# Patient Record
Sex: FEMALE | Race: WHITE | Employment: FULL TIME | URBAN - METROPOLITAN AREA
[De-identification: names, ages, dates, MRNs, and addresses within clinical notes are randomized per-mention and may not be internally consistent; named-entity substitution may affect disease eponyms.]

---

## 2017-01-16 ENCOUNTER — HOSPITAL ENCOUNTER (EMERGENCY)
Age: 47
Discharge: HOME | End: 2017-01-16
Attending: FAMILY MEDICINE

## 2017-01-16 VITALS
TEMPERATURE: 98.3 F | DIASTOLIC BLOOD PRESSURE: 74 MMHG | SYSTOLIC BLOOD PRESSURE: 130 MMHG | OXYGEN SATURATION: 100 % | WEIGHT: 121.3 LBS | HEART RATE: 78 BPM | BODY MASS INDEX: 20.71 KG/M2 | RESPIRATION RATE: 18 BRPM | HEIGHT: 64 IN

## 2017-01-16 DIAGNOSIS — M26.629 TMJ PAIN DYSFUNCTION SYNDROME: Primary | ICD-10-CM

## 2017-01-16 DIAGNOSIS — F45.8 BRUXISM: ICD-10-CM

## 2017-01-16 RX ORDER — DICLOFENAC POTASSIUM 50 MG/1
50 TABLET, FILM COATED ORAL 3 TIMES DAILY
Qty: 50 TAB | Refills: 0 | Status: SHIPPED | OUTPATIENT
Start: 2017-01-16 | End: 2017-06-28 | Stop reason: ALTCHOICE

## 2017-01-16 RX ORDER — METHOCARBAMOL 500 MG/1
500 TABLET, FILM COATED ORAL 3 TIMES DAILY
Qty: 20 TAB | Refills: 0 | Status: SHIPPED | OUTPATIENT
Start: 2017-01-16 | End: 2017-06-28 | Stop reason: ALTCHOICE

## 2017-01-17 NOTE — UC PROVIDER NOTE
Patient is a 51 y.o. female presenting with ear pain. The history is provided by the patient. No  was used. Ear Pain    This is a new problem. The current episode started 2 days ago. The problem occurs constantly. The problem has not changed since onset. Patient complains that the left ear is affected. There has been no fever. The pain is at a severity of 3/10. The pain is mild. Pertinent negatives include no ear discharge, no headaches, no hearing loss, no rhinorrhea, no sore throat and no neck pain. Risk factors: Denies recent URI, believes that she may be grinding her teeth at night. Her past medical history does not include chronic ear infection, hearing loss or tympanostomy tube.         Past Medical History   Diagnosis Date    Arthritis      L knee DJD    GERD (gastroesophageal reflux disease)     Headache     Hx of breast implants, bilateral     Nausea & vomiting      sometimes    Single cyst of left breast     Thyroid disease         Past Surgical History   Procedure Laterality Date    Hc pack laser cone  1992    Hx knee arthroscopy  2002     left    Hx cyst removal      Hx other surgical       laser surgery for dysplasia    Hx other surgical  2010     rectal     Hx other surgical       cyst removal from ovaries    Hx gyn       removal of 1 ovary and cyst    Hx breast biopsy  x3 -L     2 cysts removed and 1 \"tumor\" removed in the past    Hx breast augmentation  1998     Saline implants         Family History   Problem Relation Age of Onset    Breast Cancer Maternal Aunt      diagnosed in 63's    Cancer Maternal Aunt      breast    Breast Cancer Other      diagnosed in 42's    Other Father      Iritis    Hypertension Father     Thyroid Disease Mother     No Known Problems Sister     No Known Problems Sister     No Known Problems Son     No Known Problems Son     Diabetes Paternal Uncle     Diabetes Maternal Grandmother     Cancer Paternal Grandmother colon    Diabetes Paternal Uncle     Diabetes Paternal Uncle         Social History     Social History    Marital status:      Spouse name: N/A    Number of children: N/A    Years of education: N/A     Occupational History    Not on file. Social History Main Topics    Smoking status: Never Smoker    Smokeless tobacco: Never Used    Alcohol use No      Comment: rare    Drug use: No    Sexual activity: Yes     Partners: Male     Other Topics Concern    Not on file     Social History Narrative                ALLERGIES: Sulfa (sulfonamide antibiotics)    Review of Systems   HENT: Positive for ear pain. Negative for ear discharge, hearing loss, rhinorrhea and sore throat. Musculoskeletal: Negative for neck pain. Neurological: Negative for headaches. Vitals:    01/16/17 1859   BP: 130/74   Pulse: 78   Resp: 18   Temp: 98.3 °F (36.8 °C)   SpO2: 100%   Weight: 55 kg (121 lb 4.8 oz)   Height: 5' 4\" (1.626 m)       Physical Exam   Constitutional: She is oriented to person, place, and time. She appears well-developed and well-nourished. HENT:   Head: Normocephalic and atraumatic. Right Ear: External ear normal.   Left Ear: External ear normal.   Nose: Nose normal.   Mouth/Throat: Oropharynx is clear and moist.   + tenderness to left TMJ  No crepitus with ROM   Eyes: Conjunctivae and EOM are normal. Pupils are equal, round, and reactive to light. Neck: Normal range of motion. Neck supple. Cardiovascular: Normal rate, regular rhythm and normal heart sounds. Pulmonary/Chest: Effort normal and breath sounds normal.   Musculoskeletal: Normal range of motion. Neurological: She is alert and oriented to person, place, and time. Skin: Skin is warm and dry. Psychiatric: She has a normal mood and affect. Her behavior is normal. Judgment and thought content normal.   Nursing note and vitals reviewed.       MDM     Differential Diagnosis; Clinical Impression; Plan:     CLINICAL IMPRESSION:  TMJ pain dysfunction syndrome  (primary encounter diagnosis)  Bruxism    Plan:  1. Try the Diclofenac and Robaxin for pain, consider a mouth guard  2. Your ears look good today. There is no indication of an infection  3. Follow up with dentist   Risk of Significant Complications, Morbidity, and/or Mortality:   Presenting problems: Moderate  Diagnostic procedures:   Moderate  Progress:   Patient progress:  Stable      Procedures

## 2017-01-17 NOTE — DISCHARGE INSTRUCTIONS
Teeth Grinding: Care Instructions  Your Care Instructions  You may not be aware that you are grinding or clenching your teeth (bruxism). For many people, this happens during sleep. Even though you may be able to sleep through it, you may be grinding away parts of your teeth. If you continue to wear away your teeth, you may break or loosen a tooth or filling or wear down your biting edges. Causes of teeth grinding include stress, an abnormal bite, and crooked or missing teeth. In some cases, teeth grinding is made worse by alcohol or drug abuse. Teeth grinding and clenching can cause pain and popping in your jaw joint. Other symptoms are earaches, headaches, and face pain. Talk to your dentist about your teeth. He or she can determine what treatment is right for you. In some cases, a mouth guard or mouth splint can help protect the teeth from further damage. If stress is a cause of your grinding or clenching, your doctor may prescribe medicine to help you relax. Follow-up care is a key part of your treatment and safety. Be sure to make and go to all appointments, and call your doctor if you are having problems. Its also a good idea to know your test results and keep a list of the medicines you take. How can you care for yourself at home? · Ask your doctor or dentist to teach you how to position your tongue, teeth, and jaw to prevent grinding or clenching. Then practice this position, especially before going to sleep. · Take your medicines exactly as prescribed. Call your doctor if you think you are having a problem with your medicine. You will get more details on the specific medicines your doctor prescribes. · Get plenty of sleep. · Put either an ice pack or a warm, moist cloth on your jaw for 15 minutes several times a day if it makes your jaw feel better. Or you can switch back and forth between moist heat and cold. Gently open and close your mouth while you use the ice pack or heat.  But do not use heat if your jaw is swollen. Use only ice until the swelling is gone. · Get at least 30 minutes of exercise on most days of the week to relieve stress. Walking is a good choice. You also may want to do other activities, such as running, swimming, cycling, or playing tennis or team sports. · If you have a sleeping partner, ask him or her to let you know when you are grinding or clenching your teeth. You may be able to change positions and relax your jaw, and you both can go back to sleep. · Practice breathing and relaxation exercises to reduce tension. · Treat yourself to a massage. Some people find regular massages very helpful to relax muscles. You also can give yourself a neck, shoulder, and face massage. · During the day, try to keep your jaw, face, shoulder, and neck muscles relaxed. · Avoid hard or chewy foods (such as popcorn, jerky, tough meats, chewy breads, gum, and raw apples and carrots) that cause your jaws to work very hard. Choose softer foods that are easy to chew, such as eggs, yogurt, and soup. · Cut your food into small, bite-sized pieces, and chew slowly. · Do not chew gum for long periods of time. · If your dentist prescribes a mouth guard or splint, wear it as directed. When should you call for help? Watch closely for changes in your health, and be sure to contact your doctor if:  · You have trouble chewing or opening and closing your jaw. · You have headaches, earaches, or face pain. · You have trouble sleeping because of jaw movement or pain. · You do not get better as expected. Where can you learn more? Go to http://sim-mahesh.info/. Enter X026 in the search box to learn more about \"Teeth Grinding: Care Instructions. \"  Current as of: August 9, 2016  Content Version: 11.1  © 2086-0220 [employer]. Care instructions adapted under license by [employer] (which disclaims liability or warranty for this information).  If you have questions about a medical condition or this instruction, always ask your healthcare professional. [place] [address] any warranty or liability for your use of this information.

## 2017-06-28 ENCOUNTER — OFFICE VISIT (OUTPATIENT)
Dept: PRIMARY CARE CLINIC | Age: 47
End: 2017-06-28

## 2017-06-28 VITALS
BODY MASS INDEX: 21.03 KG/M2 | OXYGEN SATURATION: 98 % | HEIGHT: 64 IN | DIASTOLIC BLOOD PRESSURE: 84 MMHG | RESPIRATION RATE: 16 BRPM | WEIGHT: 123.2 LBS | SYSTOLIC BLOOD PRESSURE: 123 MMHG | HEART RATE: 77 BPM | TEMPERATURE: 98.3 F

## 2017-06-28 DIAGNOSIS — H66.92 LEFT OTITIS MEDIA, UNSPECIFIED CHRONICITY, UNSPECIFIED OTITIS MEDIA TYPE: Primary | ICD-10-CM

## 2017-06-28 RX ORDER — AZITHROMYCIN 250 MG/1
TABLET, FILM COATED ORAL
Qty: 6 TAB | Refills: 0 | Status: SHIPPED | OUTPATIENT
Start: 2017-06-28 | End: 2017-07-19 | Stop reason: ALTCHOICE

## 2017-06-28 NOTE — MR AVS SNAPSHOT
Visit Information Date & Time Provider Department Dept. Phone Encounter #  
 6/28/2017  3:00 PM Danielle Zimmerman NP [address] [facility] [phone] [id number] Follow-up Instructions Return if symptoms worsen or fail to improve. Your Appointments 9/15/2017 10:30 AM  
PHYSICAL with Mauricio Choi, [facility]) Appt Note: CPE//Yearly Physical w/fasting labs//last cpe 8/12/16//BAM-3/29/17; pt r/s from 8/18/17 [address] [facility] [id number]  
[address] [place] [address] Upcoming Health Maintenance Date Due INFLUENZA AGE 9 TO ADULT 8/1/2017 PAP AKA CERVICAL CYTOLOGY 11/1/2017 DTaP/Tdap/Td series (2 - Td) 12/19/2022 Allergies as of 6/28/2017  Review Complete On: 6/28/2017 By: Joaquin Halsted, LPN Severity Noted Reaction Type Reactions Sulfa (Sulfonamide Antibiotics)  03/07/2012    Rash Current Immunizations  Reviewed on 12/19/2012 Name Date Influenza Vaccine Whole 12/3/2012 Tdap 12/19/2012 Not reviewed this visit You Were Diagnosed With   
  
 Codes Comments Left otitis media, unspecified chronicity, unspecified otitis media type    -  Primary ICD-10-CM: H66.92 
ICD-9-CM: 382. 9 Vitals BP Pulse Temp Resp Height(growth percentile) Weight(growth percentile) 123/84 77 98.3 °F (36.8 °C) (Oral) 16 5' 4\" (1.626 m) 123 lb 3.2 oz (55.9 kg) LMP SpO2 BMI OB Status Smoking Status 06/25/2017 (Exact Date) 98% 21.15 kg/m2 Having regular periods Never Smoker Vitals History BMI and BSA Data Body Mass Index Body Surface Area  
 21.15 kg/m 2 1.59 m 2 Preferred Pharmacy Pharmacy Name Phone [address], [address] [place] [address] Tammy Farrell Said [phone] Your Updated Medication List  
  
   
 This list is accurate as of: 6/28/17  3:19 PM.  Always use your most recent med list.  
  
  
  
  
 azithromycin 250 mg tablet Commonly known as:  Bobscott Sparks Take by mouth, take two tablets today then one tablet daily for 4 more days. FLAXSEED OIL PO Take 600 mg by mouth daily. glucosamine-chondroitin 750-600 mg Tab One tab 2 x daily  
  
 rizatriptan 5 mg tablet Commonly known as:  Jerry Stone TAKE ONE TABLET BY MOUTH ONCE AS NEEDED FOR MIGRAINE FOR UP TO 1 DOSE. MAY REPEAT IN 2 HOURS AS NEEDED  
  
 SPIRULINA Take 100 mg by mouth daily. Prescriptions Sent to Pharmacy Refills  
 azithromycin (ZITHROMAX) 250 mg tablet 0 Sig: Take by mouth, take two tablets today then one tablet daily for 4 more days. Class: Normal  
 Pharmacy: Sabrina Nagel  at [address] #: [phone] Follow-up Instructions Return if symptoms worsen or fail to improve. Patient Instructions Ear Infection (Otitis Media): Care Instructions Your Care Instructions An ear infection may start with a cold and affect the middle ear (otitis media). It can hurt a lot. Most ear infections clear up on their own in a couple of days. Most often you will not need antibiotics. This is because many ear infections are caused by a virus. Antibiotics don't work against a virus. Regular doses of pain medicines are the best way to reduce your fever and help you feel better. Follow-up care is a key part of your treatment and safety. Be sure to make and go to all appointments, and call your doctor if you are having problems. It's also a good idea to know your test results and keep a list of the medicines you take. How can you care for yourself at home? · Take pain medicines exactly as directed. ¨ If the doctor gave you a prescription medicine for pain, take it as prescribed.  
¨ If you are not taking a prescription pain medicine, take an over-the-counter medicine, such as acetaminophen (Tylenol), ibuprofen (Advil, Motrin), or naproxen (Aleve). Read and follow all instructions on the label. ¨ Do not take two or more pain medicines at the same time unless the doctor told you to. Many pain medicines have acetaminophen, which is Tylenol. Too much acetaminophen (Tylenol) can be harmful. · Plan to take a full dose of pain reliever before bedtime. Getting enough sleep will help you get better. · Try a warm, moist washcloth on the ear. It may help relieve pain. · If your doctor prescribed antibiotics, take them as directed. Do not stop taking them just because you feel better. You need to take the full course of antibiotics. When should you call for help? Call your doctor now or seek immediate medical care if: 
· You have new or increasing ear pain. · You have new or increasing pus or blood draining from your ear. · You have a fever with a stiff neck or a severe headache. Watch closely for changes in your health, and be sure to contact your doctor if: 
· You have new or worse symptoms. · You are not getting better after taking an antibiotic for 2 days. Where can you learn more? Go to http://sim-mahesh.info/. Enter I657 in the search box to learn more about \"Ear Infection (Otitis Media): Care Instructions. \" Current as of: May 4, 2017 Content Version: 11.3 © 2838-6034 [email or web address]. Care instructions adapted under license by [employer] (which disclaims liability or warranty for this information). If you have questions about a medical condition or this instruction, always ask your healthcare professional. [address] any warranty or liability for your use of this information. Introducing [facility]! Dear Malika Palomares: Thank you for requesting a UCAN account. Our records indicate that you already have an active UCAN account.   You can access your account anytime at [email or web address] Did you know that you can access your hospital and ER discharge instructions at any time in [employer]? You can also review all of your test results from your hospital stay or ER visit. Additional Information If you have questions, please visit the Frequently Asked Questions section of the [employer] website at [email or web address] Remember, [employer] is NOT to be used for urgent needs. For medical emergencies, dial 911. Now available from your iPhone and Android! Please provide this summary of care documentation to your next provider. Your primary care clinician is listed as Oneal Lagos. If you have any questions after today's visit, please call [phone].

## 2017-06-28 NOTE — PROGRESS NOTES
Subjective:      Sirisha Mckeon is a 49 y.o. female who presents for possible ear infection. Symptoms include dizziness for 2-3 weeks. . Onset of symptoms was 2 weeks ago, unchanged since that time. Associated symptoms include lightheadedness, which have been present for 2 weeks . She is drinking plenty of fluids. She has no other sinus congestion, cough or sore throat. She has not felt dizzy in bed or when changing positions. Problem List:     Patient Active Problem List    Diagnosis Date Noted    Fibrocystic breast disease 07/29/2016    FH: diabetes mellitus 05/12/2015    Benign breast lumps 12/19/2012    Back skin lesion 12/19/2012    H/O bilateral breast implants 12/19/2012     Medical History:     Past Medical History:   Diagnosis Date    Arthritis     L knee DJD    GERD (gastroesophageal reflux disease)     Headache     Hx of breast implants, bilateral     Nausea & vomiting     sometimes    Single cyst of left breast     Thyroid disease      Allergies: Allergies   Allergen Reactions    Sulfa (Sulfonamide Antibiotics) Rash      Medications:     Current Outpatient Prescriptions   Medication Sig    azithromycin (ZITHROMAX) 250 mg tablet Take by mouth, take two tablets today then one tablet daily for 4 more days.  GLUCOSAMINE HCL/CHONDR GUZMAN A NA (GLUCOSAMINE-CHONDROITIN) 750-600 mg tab One tab 2 x daily (Patient taking differently: Take 750 mg by mouth daily. One tab 2 x daily)    rizatriptan (MAXALT) 5 mg tablet TAKE ONE TABLET BY MOUTH ONCE AS NEEDED FOR MIGRAINE FOR UP TO 1 DOSE. MAY REPEAT IN 2 HOURS AS NEEDED    BLUE-GREEN ALGAE (SPIRULINA) Take 100 mg by mouth daily.  FLAXSEED OIL PO Take 600 mg by mouth daily. No current facility-administered medications for this visit.       Surgical History:     Past Surgical History:   Procedure Laterality Date    HC PACK LASER CONE  1992    HX BREAST AUGMENTATION  1998    Saline implants    HX BREAST BIOPSY  x3 -L    2 cysts removed and 1 \"tumor\" removed in the past    HX CYST REMOVAL      HX GYN      removal of 1 ovary and cyst    HX KNEE ARTHROSCOPY  2002    left    HX OTHER SURGICAL      laser surgery for dysplasia    HX OTHER SURGICAL  2010    rectal     HX OTHER SURGICAL      cyst removal from ovaries     Social History:     Social History     Social History    Marital status:      Spouse name: N/A    Number of children: N/A    Years of education: N/A     Social History Main Topics    Smoking status: Never Smoker    Smokeless tobacco: Never Used    Alcohol use No      Comment: rare    Drug use: No    Sexual activity: Yes     Partners: Male     Other Topics Concern    None     Social History Narrative         Objective:     ROS:   Feeling well. No dyspnea or chest pain on exertion. No abdominal pain, change in bowel habits, black or bloody stools. No urinary tract symptoms. GYN ROS: normal menses, no abnormal bleeding, pelvic pain or discharge, no breast pain or new or enlarging lumps on self exam. No neurological complaints. OBJECTIVE:   The patient appears well, alert, oriented x 3, in no distress. Visit Vitals    /84    Pulse 77    Temp 98.3 °F (36.8 °C) (Oral)    Resp 16    Ht 5' 4\" (1.626 m)    Wt 123 lb 3.2 oz (55.9 kg)    LMP 06/25/2017 (Exact Date)    SpO2 98%    BMI 21.15 kg/m2     HEENT:ENT  Right ear normal, left TM Bulge and redness. Canals clear. Neck supple. No adenopathy or thyromegaly. ANNEMARIE. Chest: Lungs are clear, good air entry, no wheezes, rhonchi or rales. Cardiovascular: S1 and S2 normal, no murmurs, regular rate and rhythm. Abdomen: soft without tenderness, guarding, mass or organomegaly. Extremities: show no edema, normal peripheral pulses. Neurological: is normal, no focal findings. Assessment/Plan:       ICD-10-CM ICD-9-CM    1.  Left otitis media, unspecified chronicity, unspecified otitis media type H66.92 382.9 azithromycin (ZITHROMAX) 250 mg tablet [facility] Setting

## 2017-06-28 NOTE — PROGRESS NOTES
Chief Complaint   Patient presents with    Dizziness     Dizziness for a few weeks and wondering if it's an ear infection.  Bilateral ear pain on night

## 2017-06-28 NOTE — PATIENT INSTRUCTIONS
Ear Infection (Otitis Media): Care Instructions  Your Care Instructions    An ear infection may start with a cold and affect the middle ear (otitis media). It can hurt a lot. Most ear infections clear up on their own in a couple of days. Most often you will not need antibiotics. This is because many ear infections are caused by a virus. Antibiotics don't work against a virus. Regular doses of pain medicines are the best way to reduce your fever and help you feel better. Follow-up care is a key part of your treatment and safety. Be sure to make and go to all appointments, and call your doctor if you are having problems. It's also a good idea to know your test results and keep a list of the medicines you take. How can you care for yourself at home? · Take pain medicines exactly as directed. ¨ If the doctor gave you a prescription medicine for pain, take it as prescribed. ¨ If you are not taking a prescription pain medicine, take an over-the-counter medicine, such as acetaminophen (Tylenol), ibuprofen (Advil, Motrin), or naproxen (Aleve). Read and follow all instructions on the label. ¨ Do not take two or more pain medicines at the same time unless the doctor told you to. Many pain medicines have acetaminophen, which is Tylenol. Too much acetaminophen (Tylenol) can be harmful. · Plan to take a full dose of pain reliever before bedtime. Getting enough sleep will help you get better. · Try a warm, moist washcloth on the ear. It may help relieve pain. · If your doctor prescribed antibiotics, take them as directed. Do not stop taking them just because you feel better. You need to take the full course of antibiotics. When should you call for help? Call your doctor now or seek immediate medical care if:  · You have new or increasing ear pain. · You have new or increasing pus or blood draining from your ear. · You have a fever with a stiff neck or a severe headache.   Watch closely for changes in your health, and be sure to contact your doctor if:  · You have new or worse symptoms. · You are not getting better after taking an antibiotic for 2 days. Where can you learn more? Go to http://sim-mahesh.info/. Enter G933 in the search box to learn more about \"Ear Infection (Otitis Media): Care Instructions. \"  Current as of: May 4, 2017  Content Version: 11.3  © 3270-2816 [employer]. Care instructions adapted under license by [employer] (which disclaims liability or warranty for this information). If you have questions about a medical condition or this instruction, always ask your healthcare professional. [address] any warranty or liability for your use of this information.

## 2017-07-19 ENCOUNTER — OFFICE VISIT (OUTPATIENT)
Dept: PRIMARY CARE CLINIC | Age: 47
End: 2017-07-19

## 2017-07-19 VITALS
RESPIRATION RATE: 16 BRPM | HEIGHT: 64 IN | BODY MASS INDEX: 21.13 KG/M2 | WEIGHT: 123.8 LBS | DIASTOLIC BLOOD PRESSURE: 91 MMHG | OXYGEN SATURATION: 98 % | SYSTOLIC BLOOD PRESSURE: 147 MMHG | HEART RATE: 73 BPM | TEMPERATURE: 98.4 F

## 2017-07-19 DIAGNOSIS — H92.01 EAR PAIN, RIGHT: Primary | ICD-10-CM

## 2017-07-19 RX ORDER — AZITHROMYCIN 250 MG/1
TABLET, FILM COATED ORAL
COMMUNITY
Start: 2017-06-28 | End: 2017-07-19 | Stop reason: ALTCHOICE

## 2017-07-19 NOTE — MR AVS SNAPSHOT
Visit Information Date & Time Provider Department Dept. Phone Encounter #  
 7/19/2017  1:45 PM Sam Davey, [address] [phone] [id number] Follow-up Instructions Return if symptoms worsen or fail to improve. Your Appointments 9/15/2017 10:30 AM  
PHYSICAL with Natalie Ayala, [address] [facility]) Appt Note: CPE//Yearly Physical w/fasting labs//last cpe 8/12/16//BAM-3/29/17; pt r/s from 8/18/17 819 [facility],[address] [address]  
[address] 235 [facility] [address] [place] [address] Upcoming Health Maintenance Date Due  
 PAP AKA CERVICAL CYTOLOGY 11/1/2017 INFLUENZA AGE 9 TO ADULT 8/1/2017 DTaP/Tdap/Td series (2 - Td) 12/19/2022 Allergies as of 7/19/2017  Review Complete On: 7/19/2017 By: Sam Davey MD  
  
 Severity Noted Reaction Type Reactions Sulfa (Sulfonamide Antibiotics)  03/07/2012    Rash Current Immunizations  Reviewed on 12/19/2012 Name Date Influenza Vaccine Whole 12/3/2012 Tdap 12/19/2012 Not reviewed this visit You Were Diagnosed With   
  
 Codes Comments Ear pain, right    -  Primary ICD-10-CM: H92.01 
ICD-9-CM: 388.70 Vitals BP Pulse Temp Resp Height(growth percentile) Weight(growth percentile) (!) 147/91 (BP 1 Location: Left arm, BP Patient Position: Sitting) 73 98.4 °F (36.9 °C) (Oral) 16 5' 4\" (1.626 m) 123 lb 12.8 oz (56.2 kg) LMP SpO2 BMI OB Status Smoking Status 06/25/2017 (Exact Date) 98% 21.25 kg/m2 Having regular periods Never Smoker Vitals History BMI and BSA Data Body Mass Index Body Surface Area  
 21.25 kg/m 2 1.59 m 2 Preferred Pharmacy Pharmacy Name Phone 39[address] [facility], [address] [place] [address] Tammy Farrell Said [phone] Your Updated Medication List  
  
   
 This list is accurate as of: 7/19/17  2:11 PM.  Always use your most recent med list.  
  
  
  
  
 FLAXSEED OIL PO Take 600 mg by mouth daily. rizatriptan 5 mg tablet Commonly known as:  Alsamantha Clarion TAKE ONE TABLET BY MOUTH ONCE AS NEEDED FOR MIGRAINE FOR UP TO 1 DOSE. MAY REPEAT IN 2 HOURS AS NEEDED  
  
 SPIRULINA Take 100 mg by mouth daily. Follow-up Instructions Return if symptoms worsen or fail to improve. Patient Instructions You do NOT currently have an ear infection. You may have TMJ (jaw joint) or dental issues as a cause. Consider a bruxism (tooth grinding) guard. Gently chewing some sugar-free gum in the morning can help with some jaw stiffness. And, taking REGULAR doses of an anti-inflammatory medicine like Motrin or Aleve can help. Heat may also be helpful. Follow up with your dentist. 
  
Earache: Care Instructions Your Care Instructions Even though infection is a common cause of ear pain, not all ear pain means an infection. If you have ear pain and don't have an infection, it could be because of a jaw problem, such as temporomandibular joint (TMJ) pain. Or it could be because of a neck problem. When ear discomfort or pain is mild or comes and goes without other symptoms, home treatment may be all you need. Follow-up care is a key part of your treatment and safety. Be sure to make and go to all appointments, and call your doctor if you are having problems. It's also a good idea to know your test results and keep a list of the medicines you take. How can you care for yourself at home? · Apply heat on the ear to ease pain. To apply heat, put a warm water bottle, a heating pad set on low, or a warm cloth on your ear. Do not go to sleep with a heating pad on your skin. · Take an over-the-counter pain medicine, such as acetaminophen (Tylenol), ibuprofen (Advil, Motrin), or naproxen (Aleve). Be safe with medicines. Read and follow all instructions on the label. · Do not take two or more pain medicines at the same time unless the doctor told you to. Many pain medicines have acetaminophen, which is Tylenol. Too much acetaminophen (Tylenol) can be harmful. · Never insert anything, such as a cotton swab or a ethel pin, into the ear. When should you call for help? Call your doctor now or seek immediate medical care if: 
· You have new or worse symptoms of infection, such as: 
¨ Increased pain, swelling, warmth, or redness. ¨ Red streaks leading from the area. ¨ Pus draining from the area. ¨ A fever. Watch closely for changes in your health, and be sure to contact your doctor if: 
· You have new or worse discharge coming from the ear. · You do not get better as expected. Where can you learn more? Go to http://sim-mahesh.info/. Enter M483 in the search box to learn more about \"Earache: Care Instructions. \" Current as of: July 29, 2016 Content Version: 11.3 © 9098-6191 [employer]. Care instructions adapted under license by [employer] (which disclaims liability or warranty for this information). If you have questions about a medical condition or this instruction, always ask your healthcare professional. [address] any warranty or liability for your use of this information. Introducing [facility]! Dear Azalia Pena: Thank you for requesting a MESI account. Our records indicate that you already have an active MESI account. You can access your account anytime at [email or web address] Did you know that you can access your hospital and ER discharge instructions at any time in MESI? You can also review all of your test results from your hospital stay or ER visit. Additional Information If you have questions, please visit the Frequently Asked Questions section of the DrDoctor website at [email or web address] Remember, DrDoctor is NOT to be used for urgent needs. For medical emergencies, dial 911. Now available from your iPhone and Android! Please provide this summary of care documentation to your next provider. Your primary care clinician is listed as Sudha Jack. If you have any questions after today's visit, please call [phone].

## 2017-07-19 NOTE — PATIENT INSTRUCTIONS
You do NOT currently have an ear infection. You may have TMJ (jaw joint) or dental issues as a cause. Consider a bruxism (tooth grinding) guard. Gently chewing some sugar-free gum in the morning can help with some jaw stiffness. And, taking REGULAR doses of an anti-inflammatory medicine like Motrin or Aleve can help. Heat may also be helpful. Follow up with your dentist.     Earache: Care Instructions  Your Care Instructions  Even though infection is a common cause of ear pain, not all ear pain means an infection. If you have ear pain and don't have an infection, it could be because of a jaw problem, such as temporomandibular joint (TMJ) pain. Or it could be because of a neck problem. When ear discomfort or pain is mild or comes and goes without other symptoms, home treatment may be all you need. Follow-up care is a key part of your treatment and safety. Be sure to make and go to all appointments, and call your doctor if you are having problems. It's also a good idea to know your test results and keep a list of the medicines you take. How can you care for yourself at home? · Apply heat on the ear to ease pain. To apply heat, put a warm water bottle, a heating pad set on low, or a warm cloth on your ear. Do not go to sleep with a heating pad on your skin. · Take an over-the-counter pain medicine, such as acetaminophen (Tylenol), ibuprofen (Advil, Motrin), or naproxen (Aleve). Be safe with medicines. Read and follow all instructions on the label. · Do not take two or more pain medicines at the same time unless the doctor told you to. Many pain medicines have acetaminophen, which is Tylenol. Too much acetaminophen (Tylenol) can be harmful. · Never insert anything, such as a cotton swab or a ethel pin, into the ear. When should you call for help?   Call your doctor now or seek immediate medical care if:  · You have new or worse symptoms of infection, such as:  ¨ Increased pain, swelling, warmth, or redness. ¨ Red streaks leading from the area. ¨ Pus draining from the area. ¨ A fever. Watch closely for changes in your health, and be sure to contact your doctor if:  · You have new or worse discharge coming from the ear. · You do not get better as expected. Where can you learn more? Go to http://sim-mahesh.info/. Enter K480 in the search box to learn more about \"Earache: Care Instructions. \"  Current as of: July 29, 2016  Content Version: 11.3  © 2676-2747 [employer]. Care instructions adapted under license by [employer] (which disclaims liability or warranty for this information). If you have questions about a medical condition or this instruction, always ask your healthcare professional. [address] any warranty or liability for your use of this information.

## 2017-07-19 NOTE — PROGRESS NOTES
Chief Complaint   Patient presents with    Ear Pain     c/o continued L ear pain, some dizziness and ha, states she finished abx from office visit on 6/28/17        HPI:  49year old female who had been having 2-3 weeks of dizziness prior to her visit here on 6/28/17. She was diagnosed with a middle ear infection - treated with a Z-pack. Her dizziness has gotten better, but now says her left ear is hurting externally, feels full, and she has had headaches on that side. No fevers or chills. No nausea or vomiting. No further dizziness. No room-spinning sensation. No sore throat. Hearing is grossly OK. She says she has been known to grind her teeth and is not sure if the headaches and ear pain might be due to that. Review of Systems - no recent weight loss/gain, fevers, chills, night sweats, chest pain, shortness of breath, cough, nausea, vomiting, diarrhea, urinary frequency/urgency/dysuria.   Otherwise, ROS negative except as per HPI    Past Medical History:   Diagnosis Date    Arthritis     L knee DJD    GERD (gastroesophageal reflux disease)     Headache     Hx of breast implants, bilateral     Nausea & vomiting     sometimes    Single cyst of left breast     Thyroid disease        Past Surgical History:   Procedure Laterality Date    HC PACK LASER CONE  1992    HX BREAST AUGMENTATION  1998    Saline implants    HX BREAST BIOPSY  x3 -L    2 cysts removed and 1 \"tumor\" removed in the past    HX CYST REMOVAL      HX GYN      removal of 1 ovary and cyst    HX KNEE ARTHROSCOPY  2002    left    HX OTHER SURGICAL      laser surgery for dysplasia    HX OTHER SURGICAL  2010    rectal     HX OTHER SURGICAL      cyst removal from ovaries       Family History   Problem Relation Age of Onset    Breast Cancer Maternal Aunt      diagnosed in 63's    Cancer Maternal Aunt      breast    Breast Cancer Other      diagnosed in 42's    Hypertension Father     Other Father      iritis, colitis    Thyroid Disease Mother     No Known Problems Sister     No Known Problems Sister     No Known Problems Son     No Known Problems Son     Diabetes Paternal Uncle     Diabetes Maternal Grandmother     Cancer Paternal Grandmother      colon    Diabetes Paternal Uncle     Diabetes Paternal Uncle        Social History     Social History    Marital status:      Spouse name: N/A    Number of children: N/A    Years of education: N/A     Occupational History    Not on file. Social History Main Topics    Smoking status: Never Smoker    Smokeless tobacco: Never Used    Alcohol use No      Comment: rare    Drug use: No    Sexual activity: Yes     Partners: Male     Other Topics Concern    Not on file     Social History Narrative       Current Outpatient Prescriptions on File Prior to Visit   Medication Sig Dispense Refill    BLUE-GREEN ALGAE (SPIRULINA) Take 100 mg by mouth daily.  FLAXSEED OIL PO Take 600 mg by mouth daily.  rizatriptan (MAXALT) 5 mg tablet TAKE ONE TABLET BY MOUTH ONCE AS NEEDED FOR MIGRAINE FOR UP TO 1 DOSE. MAY REPEAT IN 2 HOURS AS NEEDED  0     No current facility-administered medications on file prior to visit. Allergies   Allergen Reactions    Sulfa (Sulfonamide Antibiotics) Rash       PE:    General:  Well-developed, well-nourished female in no apparent distress  HEENT:  Normocephalic, atraumatic, Pupils are equal, round, & reactive to light & accommodation. Extraocular movements intact. TM's normal - no redness or retraction, external auditory exam normal - no exudates. Oropharynx grossly normal.  No tonsillar enlargement, erythema, or exudates. TMJ joint itself not tender. No crepitance noted. Neck:  Supple, nontender, full ROM. No lymphadenopathy. No thyromegaly. Chest:  clear to auscultation without rales, rhonchi, or wheezes. CV:  Regular rate & rhythm without murmurs, gallops, clicks, or rubs.   Abdomen:  soft, nontender, nondistended, normoactive bowel sounds, no organomegaly. Extremities:  No edema, clubbing, or cyanosis. Full ROM, nontender. Orders Placed This Encounter    DISCONTD: azithromycin (ZITHROMAX) 250 mg tablet       1. Ear pain, right      Follow-up Disposition:  Return if symptoms worsen or fail to improve.     Trish George MD

## 2017-07-25 ENCOUNTER — TELEPHONE (OUTPATIENT)
Dept: INTERNAL MEDICINE CLINIC | Age: 47
End: 2017-07-25

## 2017-07-25 NOTE — TELEPHONE ENCOUNTER
Pt states she has been having headaches and her b/p is up. Pt has a CPE in Sept, but she needs advise as to what to do now. Please call.

## 2017-07-25 NOTE — TELEPHONE ENCOUNTER
Called, spoke to pt. Two pt identifiers confirmed. Pt states having been seen at [facility] and high Bp-147/91. As high as 140's/90's. Pt c/o HA for 3 days. Consistent. Worse at night. Advil/bc powder-slight alleviation. Pt states usually one energy drinks-green tea and occasional mountain dew once weekly. Pt states slightly using salt in diet. Pt states no stressors. Pt offered and accepted appt for 7/26/17 1315. Pt asking if fasting is needed. Pt told to fast unless specified per Dr. Val Menendez. Pt had CPE 9/15/17. Pt informed Dr. Val Menendez will be notified. Pt verbalized understanding of information discussed w/ no further questions at this time.

## 2017-07-26 ENCOUNTER — OFFICE VISIT (OUTPATIENT)
Dept: INTERNAL MEDICINE CLINIC | Age: 47
End: 2017-07-26

## 2017-07-26 VITALS
OXYGEN SATURATION: 97 % | TEMPERATURE: 97.9 F | WEIGHT: 122 LBS | HEIGHT: 64 IN | SYSTOLIC BLOOD PRESSURE: 118 MMHG | HEART RATE: 60 BPM | DIASTOLIC BLOOD PRESSURE: 76 MMHG | RESPIRATION RATE: 16 BRPM | BODY MASS INDEX: 20.83 KG/M2

## 2017-07-26 DIAGNOSIS — Z00.00 PHYSICAL EXAM, ANNUAL: Primary | ICD-10-CM

## 2017-07-26 RX ORDER — ST. JOHN'S WORT 300 MG
CAPSULE ORAL
COMMUNITY
End: 2017-09-25

## 2017-07-26 NOTE — MR AVS SNAPSHOT
Visit Information Date & Time Provider Department Dept. Phone Encounter #  
 7/26/2017  1:15 PM Charley Carol, [address] [facility] [phone] [id number] Follow-up Instructions Return in about 1 year (around 7/26/2018). Your Appointments 9/15/2017 10:30 AM  
PHYSICAL with Charley Medina, [address] [facility]) Appt Note: CPE//Yearly Physical w/fasting labs//last cpe 8/12/16//BAM-3/29/17; pt r/s from 8/18/17 819 [facility],[address] [address]  
[address] 235 [facility] [address] Upcoming Health Maintenance Date Due  
 PAP AKA CERVICAL CYTOLOGY 11/1/2017 INFLUENZA AGE 9 TO ADULT 8/1/2017 DTaP/Tdap/Td series (2 - Td) 12/19/2022 Allergies as of 7/26/2017  Review Complete On: 7/26/2017 By: Charley Medina MD  
  
 Severity Noted Reaction Type Reactions Sulfa (Sulfonamide Antibiotics)  03/07/2012    Rash Current Immunizations  Reviewed on 12/19/2012 Name Date Influenza Vaccine Whole 12/3/2012 Tdap 12/19/2012 Not reviewed this visit You Were Diagnosed With   
  
 Codes Comments Physical exam, annual    -  Primary ICD-10-CM: Z00.00 ICD-9-CM: V70.0 Vitals BP Pulse Temp Resp Height(growth percentile) Weight(growth percentile) 118/76 (BP 1 Location: Left arm, BP Patient Position: Sitting) 60 97.9 °F (36.6 °C) (Oral) 16 5' 4\" (1.626 m) 122 lb (55.3 kg) LMP SpO2 BMI OB Status Smoking Status 06/25/2017 (Exact Date) 97% 20.94 kg/m2 Having regular periods Never Smoker Vitals History BMI and BSA Data Body Mass Index Body Surface Area  
 20.94 kg/m 2 1.58 m 2 Preferred Pharmacy Pharmacy Name Phone [facility], [address] [place] [address] Tammy Farrell Said [phone] Your Updated Medication List  
  
   
 This list is accurate as of: 7/26/17  1:22 PM.  Always use your most recent med list.  
  
  
  
  
 FLAXSEED OIL PO Take 600 mg by mouth daily. rizatriptan 5 mg tablet Commonly known as:  Jaqueline Avers TAKE ONE TABLET BY MOUTH ONCE AS NEEDED FOR MIGRAINE FOR UP TO 1 DOSE. MAY REPEAT IN 2 HOURS AS NEEDED  
  
 SPIRULINA Take 100 mg by mouth daily. low's wort 300 mg Cap Take  by mouth. We Performed the Following CBC W/O DIFF [39371 CPT(R)] FOLLICLE STIMULATING HORMONE [20618 CPT(R)] HEMOGLOBIN A1C WITH EAG [29444 CPT(R)] LIPID PANEL [02654 CPT(R)] METABOLIC PANEL, COMPREHENSIVE [50495 CPT(R)] TSH 3RD GENERATION [19884 CPT(R)] VITAMIN D, 25 HYDROXY [id number] CPT(R)] Follow-up Instructions Return in about 1 year (around 7/26/2018). Introducing [address]! Dear [employer]: Thank you for requesting a Verid account. Our records indicate that you already have an active Verid account. You can access your account anytime at [email or web address] Did you know that you can access your hospital and ER discharge instructions at any time in Verid? You can also review all of your test results from your hospital stay or ER visit. Additional Information If you have questions, please visit the Frequently Asked Questions section of the Verid website at [email or web address] Remember, Verid is NOT to be used for urgent needs. For medical emergencies, dial 911. Now available from your iPhone and Android! Please provide this summary of care documentation to your next provider. Your primary care clinician is listed as Donald Sánchez. If you have any questions after today's visit, please call [phone].

## 2017-07-26 NOTE — PROGRESS NOTES
HISTORY OF PRESENT ILLNESS  Fred Tamayo is a 49 y.o. female. HPI   Last here 8/12/16. Pt is here for routine care    BP today is great- 118/76  Pt was concerned d/t higher readings at other doctors  BP had gone up to 140s/90s   Home cuff brought in and was acurate today  bp at home was 114/85, 128/81    She went to [facility] for dizziness, was tx for ear infxn and sx improved  Had high bp then, was also clenching jaw but sx resolved on their own  No more dizziness or HA     Pt c/o HAs but this is now resolved    Reviewed last labs 8/16  Repeat labs today     Wt is stable since last year  Her weight is within normal ranges    She is lifting wts and walking for exercise. Goes at lunch at work   jada walk 7miles at a time    Pt reports periods becoming more irregular wonders about menopause. Has mole left leg. . --discussed if not healing see derm--looks like scratch not mole    Recall fmhx breast cancer     PREVENTIVE:  Colonoscopy: 17 years ago, due 1/2019 at age 47  Pap: Dr. Mika Milner, 11/16  Mammogram: [facility]17 [facility]  DEXA: not yet needed  Tdap: 12/19/2012  Pneumovax: not yet needed  Lulla Mano: not yet needed  Zostavax: not yet needed  Flu shot: fall 2016  Eye exam: Dr. Merrill Collins, 8/15--due  Lipids: 8/16 LDL 97, 7/17 ordered      Patient Active Problem List    Diagnosis Date Noted    Fibrocystic breast disease 07/29/2016    FH: diabetes mellitus 05/12/2015    Benign breast lumps 12/19/2012    Back skin lesion 12/19/2012    H/O bilateral breast implants 12/19/2012     Current Outpatient Prescriptions   Medication Sig Dispense Refill    low's wort 300 mg cap Take  by mouth.  rizatriptan (MAXALT) 5 mg tablet TAKE ONE TABLET BY MOUTH ONCE AS NEEDED FOR MIGRAINE FOR UP TO 1 DOSE. MAY REPEAT IN 2 HOURS AS NEEDED  0    BLUE-GREEN ALGAE (SPIRULINA) Take 100 mg by mouth daily.  FLAXSEED OIL PO Take 600 mg by mouth daily.        Past Surgical History:   Procedure Laterality Date    [facility]. PACK LASER CONE  1992    HX BREAST AUGMENTATION  1998    Saline implants    HX BREAST BIOPSY  x3 -L    2 cysts removed and 1 \"tumor\" removed in the past    HX CYST REMOVAL      HX GYN      removal of 1 ovary and cyst    HX KNEE ARTHROSCOPY  2002    left    HX OTHER SURGICAL      laser surgery for dysplasia    HX OTHER SURGICAL  2010    rectal     HX OTHER SURGICAL      cyst removal from ovaries      Lab Results  Component Value Date/Time   WBC 5.7 08/12/2016 11:20 AM   HGB 14.1 08/12/2016 11:20 AM   Hemoglobin (POC) 14.5 06/14/2016 09:26 AM   HCT 41.2 08/12/2016 11:20 AM   PLATELET 936 31/71/4318 11:20 AM   MCV 87 08/12/2016 11:20 AM     Lab Results  Component Value Date/Time   Cholesterol, total 186 08/12/2016 11:20 AM   HDL Cholesterol 77 08/12/2016 11:20 AM   LDL, calculated 97 08/12/2016 11:20 AM   Triglyceride 60 08/12/2016 11:20 AM     Lab Results  Component Value Date/Time   GFR est non- 05/12/2015 12:00 AM   GFR est  05/12/2015 12:00 AM   Creatinine 0.64 05/12/2015 12:00 AM   BUN 8 05/12/2015 12:00 AM   Sodium 143 05/12/2015 12:00 AM   Potassium 4.1 05/12/2015 12:00 AM   Chloride 105 05/12/2015 12:00 AM   CO2 21 05/12/2015 12:00 AM          Review of Systems   Respiratory: Negative for shortness of breath. Cardiovascular: Negative for chest pain. Physical Exam   Constitutional: She is oriented to person, place, and time. She appears well-developed and well-nourished. No distress. HENT:   Head: Normocephalic and atraumatic. Right Ear: External ear normal.   Left Ear: External ear normal.   Mouth/Throat: Oropharynx is clear and moist. No oropharyngeal exudate. Eyes: Conjunctivae and EOM are normal. Right eye exhibits no discharge. Left eye exhibits no discharge. Neck: Normal range of motion. Neck supple. No carotid bruits   Cardiovascular: Normal rate, regular rhythm, normal heart sounds and intact distal pulses. Exam reveals no gallop and no friction rub.     No murmur heard.  Pulmonary/Chest: Effort normal and breath sounds normal. No respiratory distress. She has no wheezes. She has no rales. She exhibits no tenderness. Abdominal: She exhibits no distension and no mass. There is no tenderness. There is no rebound and no guarding. Musculoskeletal: Normal range of motion. She exhibits no edema, tenderness or deformity. Lymphadenopathy:     She has no cervical adenopathy. Neurological: She is alert and oriented to person, place, and time. Coordination normal.   Skin: Skin is warm and dry. No rash noted. She is not diaphoretic. No erythema. No pallor. Psychiatric: She has a normal mood and affect. Her behavior is normal.   left thigh 2mm excoriation    ASSESSMENT and PLAN    ICD-10-CM ICD-9-CM    1. Physical exam, annual    Normal wt, normal BP. Labs to be completed today. Colonoscopy will be due at age 47. Eye exam will be later this summer, mammogram completed at Dr. Hal John office in May, will get report. Pelvic exam is in November  Z00.00 V70.0 LIPID PANEL      METABOLIC PANEL, COMPREHENSIVE      CBC W/O DIFF      TSH 3RD GENERATION      VITAMIN D, 25 HYDROXY      HEMOGLOBIN A1C WITH EAG      FOLLICLE STIMULATING HORMONE          Written by Ana María Carty, as dictated by Lilli Byrne MD.    Current diagnosis and concerns discussed with pt at length. Understands risks and benefits or current treatment plan and medications and accepts the treatment and medication with any possible risks.   Pt asks appropriate questions which were answered.   Pt instructed to call with any concerns or problems. This note will not be viewable in [address]

## 2017-07-27 LAB
25(OH)D3+25(OH)D2 SERPL-MCNC: 42.8 NG/ML (ref 30–100)
ALBUMIN SERPL-MCNC: 4.7 G/DL (ref 3.5–5.5)
ALBUMIN/GLOB SERPL: 1.6 {RATIO} (ref 1.2–2.2)
ALP SERPL-CCNC: 59 IU/L (ref 39–117)
ALT SERPL-CCNC: 16 IU/L (ref 0–32)
AST SERPL-CCNC: 21 IU/L (ref 0–40)
BILIRUB SERPL-MCNC: 0.6 MG/DL (ref 0–1.2)
BUN SERPL-MCNC: 10 MG/DL (ref 6–24)
BUN/CREAT SERPL: 16 (ref 9–23)
CALCIUM SERPL-MCNC: 9.2 MG/DL (ref 8.7–10.2)
CHLORIDE SERPL-SCNC: 105 MMOL/L (ref 96–106)
CHOLEST SERPL-MCNC: 171 MG/DL (ref 100–199)
CO2 SERPL-SCNC: 25 MMOL/L (ref 18–29)
CREAT SERPL-MCNC: 0.61 MG/DL (ref 0.57–1)
ERYTHROCYTE [DISTWIDTH] IN BLOOD BY AUTOMATED COUNT: 12.7 % (ref 12.3–15.4)
EST. AVERAGE GLUCOSE BLD GHB EST-MCNC: 91 MG/DL
FSH SERPL-ACNC: 20.9 MIU/ML
GLOBULIN SER CALC-MCNC: 3 G/DL (ref 1.5–4.5)
GLUCOSE SERPL-MCNC: 75 MG/DL (ref 65–99)
HBA1C MFR BLD: 4.8 % (ref 4.8–5.6)
HCT VFR BLD AUTO: 38.3 % (ref 34–46.6)
HDLC SERPL-MCNC: 73 MG/DL
HGB BLD-MCNC: 13.2 G/DL (ref 11.1–15.9)
LDLC SERPL CALC-MCNC: 87 MG/DL (ref 0–99)
MCH RBC QN AUTO: 30.1 PG (ref 26.6–33)
MCHC RBC AUTO-ENTMCNC: 34.5 G/DL (ref 31.5–35.7)
MCV RBC AUTO: 87 FL (ref 79–97)
PLATELET # BLD AUTO: 257 X10E3/UL (ref 150–379)
POTASSIUM SERPL-SCNC: 4 MMOL/L (ref 3.5–5.2)
PROT SERPL-MCNC: 7.7 G/DL (ref 6–8.5)
RBC # BLD AUTO: 4.39 X10E6/UL (ref 3.77–5.28)
SODIUM SERPL-SCNC: 143 MMOL/L (ref 134–144)
TRIGL SERPL-MCNC: 54 MG/DL (ref 0–149)
TSH SERPL DL<=0.005 MIU/L-ACNC: 1.03 UIU/ML (ref 0.45–4.5)
VLDLC SERPL CALC-MCNC: 11 MG/DL (ref 5–40)
WBC # BLD AUTO: 4.9 X10E3/UL (ref 3.4–10.8)

## 2017-09-25 ENCOUNTER — HOSPITAL ENCOUNTER (EMERGENCY)
Age: 47
Discharge: HOME | End: 2017-09-25
Attending: FAMILY MEDICINE

## 2017-09-25 VITALS
SYSTOLIC BLOOD PRESSURE: 137 MMHG | TEMPERATURE: 98.4 F | HEART RATE: 72 BPM | OXYGEN SATURATION: 99 % | BODY MASS INDEX: 21.91 KG/M2 | DIASTOLIC BLOOD PRESSURE: 84 MMHG | HEIGHT: 64 IN | WEIGHT: 128.3 LBS | RESPIRATION RATE: 18 BRPM

## 2017-09-25 DIAGNOSIS — H66.002 ACUTE SUPPURATIVE OTITIS MEDIA OF LEFT EAR WITHOUT SPONTANEOUS RUPTURE OF TYMPANIC MEMBRANE, RECURRENCE NOT SPECIFIED: Primary | ICD-10-CM

## 2017-09-25 RX ORDER — FLUCONAZOLE 150 MG/1
150 TABLET ORAL
Qty: 1 TAB | Refills: 0 | Status: SHIPPED | OUTPATIENT
Start: 2017-09-25 | End: 2017-09-25

## 2017-09-25 RX ORDER — MINERAL OIL
180 ENEMA (ML) RECTAL DAILY
Qty: 30 TAB | Refills: 0 | Status: SHIPPED | OUTPATIENT
Start: 2017-09-25 | End: 2018-02-21

## 2017-09-25 RX ORDER — FLUTICASONE PROPIONATE 50 MCG
2 SPRAY, SUSPENSION (ML) NASAL DAILY
Qty: 1 BOTTLE | Refills: 0 | Status: SHIPPED | OUTPATIENT
Start: 2017-09-25 | End: 2017-10-09

## 2017-09-25 RX ORDER — AMOXICILLIN 875 MG/1
875 TABLET, FILM COATED ORAL EVERY 12 HOURS
Qty: 20 TAB | Refills: 0 | Status: SHIPPED | OUTPATIENT
Start: 2017-09-25 | End: 2017-10-05

## 2017-09-26 NOTE — DISCHARGE INSTRUCTIONS
Ear Infection (Otitis Media): Care Instructions  Your Care Instructions    An ear infection may start with a cold and affect the middle ear (otitis media). It can hurt a lot. Most ear infections clear up on their own in a couple of days. Most often you will not need antibiotics. This is because many ear infections are caused by a virus. Antibiotics don't work against a virus. Regular doses of pain medicines are the best way to reduce your fever and help you feel better. Follow-up care is a key part of your treatment and safety. Be sure to make and go to all appointments, and call your doctor if you are having problems. It's also a good idea to know your test results and keep a list of the medicines you take. How can you care for yourself at home? · Take pain medicines exactly as directed. ¨ If the doctor gave you a prescription medicine for pain, take it as prescribed. ¨ If you are not taking a prescription pain medicine, take an over-the-counter medicine, such as acetaminophen (Tylenol), ibuprofen (Advil, Motrin), or naproxen (Aleve). Read and follow all instructions on the label. ¨ Do not take two or more pain medicines at the same time unless the doctor told you to. Many pain medicines have acetaminophen, which is Tylenol. Too much acetaminophen (Tylenol) can be harmful. · Plan to take a full dose of pain reliever before bedtime. Getting enough sleep will help you get better. · Try a warm, moist washcloth on the ear. It may help relieve pain. · If your doctor prescribed antibiotics, take them as directed. Do not stop taking them just because you feel better. You need to take the full course of antibiotics. When should you call for help? Call your doctor now or seek immediate medical care if:  · You have new or increasing ear pain. · You have new or increasing pus or blood draining from your ear. · You have a fever with a stiff neck or a severe headache.   Watch closely for changes in your health, and be sure to contact your doctor if:  · You have new or worse symptoms. · You are not getting better after taking an antibiotic for 2 days. Where can you learn more? Go to http://sim-mahesh.info/. Enter J975 in the search box to learn more about \"Ear Infection (Otitis Media): Care Instructions. \"  Current as of: May 4, 2017  Content Version: 11.3  © 2631-5781 [employer]. Care instructions adapted under license by [employer] (which disclaims liability or warranty for this information). If you have questions about a medical condition or this instruction, always ask your healthcare professional. [address] any warranty or liability for your use of this information.

## 2017-09-26 NOTE — UC PROVIDER NOTE
Patient is a 49 y.o. female presenting with ear pain. The history is provided by the patient. Ear Pain    This is a recurrent (\"for months\") problem. Episode onset: 1 week ago. The problem occurs constantly. The problem has been gradually worsening. Patient complains that the left ear is affected. There has been no fever. The pain is moderate. Associated symptoms include rhinorrhea. Pertinent negatives include no ear discharge, no headaches, no hearing loss, no sore throat, no vomiting, no cough and no rash.         Past Medical History:   Diagnosis Date    Arthritis     L knee DJD    GERD (gastroesophageal reflux disease)     Headache     Hx of breast implants, bilateral     Nausea & vomiting     sometimes    Single cyst of left breast     Thyroid disease         Past Surgical History:   Procedure Laterality Date    HC PACK LASER CONE  1992    HX BREAST AUGMENTATION  1998    Saline implants    HX BREAST BIOPSY  x3 -L    2 cysts removed and 1 \"tumor\" removed in the past    HX CYST REMOVAL      HX GYN      removal of 1 ovary and cyst    HX KNEE ARTHROSCOPY  2002    left    HX OTHER SURGICAL      laser surgery for dysplasia    HX OTHER SURGICAL  2010    rectal     HX OTHER SURGICAL      cyst removal from ovaries         Family History   Problem Relation Age of Onset    Breast Cancer Maternal Aunt      diagnosed in 63's    Cancer Maternal Aunt      breast    Breast Cancer Other      diagnosed in 42's    Hypertension Father     Other Father      iritis, colitis    Thyroid Disease Mother     No Known Problems Sister     No Known Problems Sister     No Known Problems Son     No Known Problems Son     Diabetes Paternal Uncle     Diabetes Maternal Grandmother     Cancer Paternal Grandmother      colon    Diabetes Paternal Uncle     Diabetes Paternal Uncle         Social History     Social History    Marital status:      Spouse name: N/A    Number of children: N/A    Years of education: N/A     Occupational History    Not on file. Social History Main Topics    Smoking status: Never Smoker    Smokeless tobacco: Never Used    Alcohol use No      Comment: rare    Drug use: No    Sexual activity: Yes     Partners: Male     Other Topics Concern    Not on file     Social History Narrative                ALLERGIES: Sulfa (sulfonamide antibiotics)    Review of Systems   Constitutional: Negative for chills and fever. HENT: Positive for ear pain and rhinorrhea. Negative for ear discharge, hearing loss and sore throat. Respiratory: Negative for cough, shortness of breath and wheezing. Cardiovascular: Negative for chest pain and palpitations. Gastrointestinal: Negative for nausea and vomiting. Musculoskeletal: Negative for myalgias. Skin: Negative for rash. Neurological: Negative for headaches. Vitals:    09/25/17 2004   BP: 137/84   Pulse: 72   Resp: 18   Temp: 98.4 °F (36.9 °C)   SpO2: 99%   Weight: 58.2 kg (128 lb 4.8 oz)   Height: 5' 4\" (1.626 m)       Physical Exam   Constitutional: She appears well-developed and well-nourished. No distress. HENT:   Right Ear: Tympanic membrane, external ear and ear canal normal.   Left Ear: External ear and ear canal normal. Tympanic membrane is erythematous and bulging. A middle ear effusion is present. Nose: Nose normal.   Mouth/Throat: Oropharynx is clear and moist and mucous membranes are normal. No oropharyngeal exudate, posterior oropharyngeal edema, posterior oropharyngeal erythema or tonsillar abscesses. Cardiovascular: Normal rate, regular rhythm and normal heart sounds. Pulmonary/Chest: Effort normal and breath sounds normal. No respiratory distress. She has no wheezes. She has no rales. Lymphadenopathy:     She has no cervical adenopathy. Neurological: She is alert. Skin: She is not diaphoretic. Psychiatric: She has a normal mood and affect.  Her behavior is normal. Judgment and thought content normal.   Nursing note and vitals reviewed. MDM     Differential Diagnosis; Clinical Impression; Plan:     CLINICAL IMPRESSION:  Acute suppurative otitis media of left ear without spontaneous rupture of tympanic membrane, recurrence not specified  (primary encounter diagnosis)    Plan:  1. Amoxicillin  2. Flonase, Allegra for at least 2 weeks  3. Diflucan if needed, as requested  Risk of Significant Complications, Morbidity, and/or Mortality:   Presenting problems: Moderate  Management options:   Moderate  Progress:   Patient progress:  Stable      Procedures

## 2017-10-02 NOTE — UC NOTE
Pt called and requested another antibiotic. Discussed with WILFREDO Moreira, prescription for Z-leo called to [facility] in [place]. Attempted to notify pt, no answer, left message.

## 2017-10-13 ENCOUNTER — HOSPITAL ENCOUNTER (EMERGENCY)
Age: 47
Discharge: HOME | End: 2017-10-13
Attending: FAMILY MEDICINE

## 2017-10-13 VITALS
BODY MASS INDEX: 21.56 KG/M2 | WEIGHT: 126.3 LBS | TEMPERATURE: 98.4 F | HEIGHT: 64 IN | OXYGEN SATURATION: 100 % | DIASTOLIC BLOOD PRESSURE: 89 MMHG | SYSTOLIC BLOOD PRESSURE: 144 MMHG | RESPIRATION RATE: 16 BRPM | HEART RATE: 70 BPM

## 2017-10-13 DIAGNOSIS — H69.82 EUSTACHIAN TUBE DYSFUNCTION, LEFT: Primary | ICD-10-CM

## 2017-10-13 NOTE — DISCHARGE INSTRUCTIONS
Eustachian Tube Problems: Care Instructions  Your Care Instructions    The eustachian (say \"you-STAY-shee-un\") tubes run between the inside of the ears and the throat. They keep air pressure stable in the ears. If your eustachian tubes become blocked, the air pressure in your ears changes. The fluids from a cold can clog eustachian tubes, causing pain in the ears. A quick change in air pressure can cause eustachian tubes to close up. This might happen when an airplane changes altitude or when a  goes up or down underwater. Eustachian tube problems often clear up on their own or after antibiotic treatment. If your tubes continue to be blocked, you may need surgery. Follow-up care is a key part of your treatment and safety. Be sure to make and go to all appointments, and call your doctor if you are having problems. It's also a good idea to know your test results and keep a list of the medicines you take. How can you care for yourself at home? · To ease ear pain, apply a warm washcloth or a heating pad set on low. There may be some drainage from the ear when the heat melts earwax. Put a cloth between the heat source and your skin. Do not use a heating pad with children. · If your doctor prescribed antibiotics, take them as directed. Do not stop taking them just because you feel better. You need to take the full course of antibiotics. · Your doctor may recommend over-the-counter medicine. Be safe with medicines. Oral or nasal decongestants may relieve ear pain. Avoid decongestants that are combined with antihistamines, which tend to cause more blockage. But if allergies seem to be the problem, your doctor may recommend a combination. Be careful with cough and cold medicines. Don't give them to children younger than 6, because they don't work for children that age and can even be harmful. For children 6 and older, always follow all the instructions carefully.  Make sure you know how much medicine to give and how long to use it. And use the dosing device if one is included. When should you call for help? Call your doctor now or seek immediate medical care if:  · You develop sudden, complete hearing loss. · You have severe pain or feel dizzy. · You have new or increasing pus or blood draining from your ear. · You have redness, swelling, or pain around or behind the ear. Watch closely for changes in your health, and be sure to contact your doctor if:  · You do not get better after 2 weeks. · You have any new symptoms, such as itching or a feeling of fullness in the ear. Where can you learn more? Go to http://sim-mahesh.info/. Enter Y822 in the search box to learn more about \"Eustachian Tube Problems: Care Instructions. \"  Current as of: May 4, 2017  Content Version: 11.3  © 9925-8337 [employer]. Care instructions adapted under license by [employer] (which disclaims liability or warranty for this information). If you have questions about a medical condition or this instruction, always ask your healthcare professional. [address] any warranty or liability for your use of this information.

## 2017-10-13 NOTE — UC PROVIDER NOTE
HPI Comments:     Here for 2-3 days of left ear pain described as mild. 2/10. \"feels like ear needs to pop\" No fever or chills. No other upper respiratory symptoms. Overall not improved. Was on 2 rounds of antibiotics starting on 09/25 for ear infections, first Amox then z pack. Said ear infection improved. Patient is a 49 y.o. female presenting with ear pain. Ear Pain           Past Medical History:   Diagnosis Date    Arthritis     L knee DJD    GERD (gastroesophageal reflux disease)     Headache     Hx of breast implants, bilateral     Nausea & vomiting     sometimes    Single cyst of left breast     Thyroid disease         Past Surgical History:   Procedure Laterality Date    HC PACK LASER CONE  1992    HX BREAST AUGMENTATION  1998    Saline implants    HX BREAST BIOPSY  x3 -L    2 cysts removed and 1 \"tumor\" removed in the past    HX CYST REMOVAL      HX GYN      removal of 1 ovary and cyst    HX KNEE ARTHROSCOPY  2002    left    HX OTHER SURGICAL      laser surgery for dysplasia    HX OTHER SURGICAL  2010    rectal     HX OTHER SURGICAL      cyst removal from ovaries         Family History   Problem Relation Age of Onset    Breast Cancer Maternal Aunt      diagnosed in 63's    Cancer Maternal Aunt      breast    Breast Cancer Other      diagnosed in 42's    Hypertension Father     Other Father      iritis, colitis    Thyroid Disease Mother     No Known Problems Sister     No Known Problems Sister     No Known Problems Son     No Known Problems Son     Diabetes Paternal Uncle     Diabetes Maternal Grandmother     Cancer Paternal Grandmother      colon    Diabetes Paternal Uncle     Diabetes Paternal Uncle         Social History     Social History    Marital status:      Spouse name: N/A    Number of children: N/A    Years of education: N/A     Occupational History    Not on file.      Social History Main Topics    Smoking status: Never Smoker    Smokeless tobacco: Never Used    Alcohol use No      Comment: rare    Drug use: No    Sexual activity: Yes     Partners: Male     Other Topics Concern    Not on file     Social History Narrative                ALLERGIES: Sulfa (sulfonamide antibiotics)    Review of Systems   HENT: Positive for ear pain. All other systems reviewed and are negative. Vitals:    10/13/17 1754   BP: 144/89   Pulse: 70   Resp: 16   Temp: 98.4 °F (36.9 °C)   SpO2: 100%   Weight: 57.3 kg (126 lb 4.8 oz)   Height: 5' 4\" (1.626 m)       Physical Exam   Constitutional: She is oriented to person, place, and time. No distress. Non-toxic appearing, well hydrated   HENT:   Head: Normocephalic and atraumatic. Right Ear: External ear normal.   Left Ear: External ear normal.   Nose: Nose normal.   Mouth/Throat: Oropharynx is clear and moist. No oropharyngeal exudate. Eyes: EOM are normal. Pupils are equal, round, and reactive to light. No scleral icterus. Cardiovascular: Regular rhythm and normal heart sounds. Exam reveals no gallop and no friction rub. No murmur heard. Pulmonary/Chest: Effort normal and breath sounds normal. No respiratory distress. She has no wheezes. She has no rales. She exhibits no tenderness. Lymphadenopathy:     She has no cervical adenopathy. Neurological: She is alert and oriented to person, place, and time. No cranial nerve deficit. Skin: Skin is warm and dry. No rash noted. She is not diaphoretic. No erythema. No pallor. Psychiatric: She has a normal mood and affect. Her behavior is normal. Judgment and thought content normal.   Nursing note and vitals reviewed. [facility]     Differential Diagnosis; Clinical Impression; Plan:       CLINICAL IMPRESSION:  (H69.82) Eustachian tube dysfunction, left  (primary encounter diagnosis)    TMs look great. Plan:  1. May try OTC Flonase for 2-4 weeks, behind the counter pseudoephedrine (for 3-4 days.)  2. Warm moist compress behind ear.     3. Auto insufflation for 1-2 days; gently    Follow up with PCP for fevers of significant worsening or come back here for re-check    Risk of Significant Complications, Morbidity, and/or Mortality:   Presenting problems:  Low  Diagnostic procedures:  Low  Management options:  Low  Progress:   Patient progress:  Stable      Procedures

## 2018-01-19 ENCOUNTER — TELEPHONE (OUTPATIENT)
Dept: INTERNAL MEDICINE CLINIC | Age: 48
End: 2018-01-19

## 2018-01-19 NOTE — TELEPHONE ENCOUNTER
Pt stated she has had hives since October.  Pt has seen a Dermatologist, an Allergist and today to an Aromatologist. Pt would like to know if the doctor would like to check her Thyroid again or send her out to an Endocrinologist or wait to hear back from the Aromatologist. Pt has an upcoming appt on January 31st. With the [address] [place] [address] Avenue contact: [phone]       Message received & copied from [facility]

## 2018-01-22 NOTE — TELEPHONE ENCOUNTER
Spoke with patient. Two pt identifiers confirmed. Patient notified per Dr. Luis Antonio Preston thyroid function was normal in July. Patient notified that she should not need to check this again, however if she would like to have it checked and was instructed to do so we can run tsh for her. Patient states that she actually has the results of her most recent TSH, patient would like to fax over those results for Dr. Lui sAntonio Preston to review. Awaiting results from patient.

## 2018-01-24 ENCOUNTER — TELEPHONE (OUTPATIENT)
Dept: INTERNAL MEDICINE CLINIC | Age: 48
End: 2018-01-24

## 2018-01-24 NOTE — TELEPHONE ENCOUNTER
Spoke with patient. Two pt identifiers confirmed. Notified patient that we have not yet received the labs that she faxed over for Dr. Elissa Polk to review. Requested that patient scan and send through [employer]. Patient agreed.

## 2018-01-24 NOTE — TELEPHONE ENCOUNTER
#[phone] pt states she faxed lab results and hasn't heard anything from you. She sent a message through my chart and that hadn't been read she states. Please call pt today she ask.

## 2018-01-26 DIAGNOSIS — Z13.29 SCREENING FOR THYROID DISORDER: Primary | ICD-10-CM

## 2018-01-29 ENCOUNTER — APPOINTMENT (OUTPATIENT)
Dept: INTERNAL MEDICINE CLINIC | Age: 48
End: 2018-01-29

## 2018-01-30 LAB
T4 FREE SERPL-MCNC: 1.45 NG/DL (ref 0.82–1.77)
TSH SERPL DL<=0.005 MIU/L-ACNC: 0.57 UIU/ML (ref 0.45–4.5)

## 2018-02-08 ENCOUNTER — TELEPHONE (OUTPATIENT)
Dept: INTERNAL MEDICINE CLINIC | Age: 48
End: 2018-02-08

## 2018-02-08 RX ORDER — LEVOCETIRIZINE DIHYDROCHLORIDE 5 MG/1
5 TABLET, FILM COATED ORAL DAILY
Qty: 30 TAB | Refills: 3 | Status: SHIPPED | OUTPATIENT
Start: 2018-02-08 | End: 2018-02-21

## 2018-02-08 RX ORDER — MINERAL OIL
180 ENEMA (ML) RECTAL DAILY
Qty: 30 TAB | Refills: 3 | Status: SHIPPED | OUTPATIENT
Start: 2018-02-08 | End: 2018-02-21

## 2018-02-08 NOTE — TELEPHONE ENCOUNTER
Spoke to Darshan Gentile at [employer]. [facility] informed that per Dr. Dio Mckeon, pt can take both medications.

## 2018-02-08 NOTE — TELEPHONE ENCOUNTER
#[phone] Please call [address] as there is a problem with the two scripts sent in as they are the same type of medication.

## 2018-02-16 ENCOUNTER — APPOINTMENT (OUTPATIENT)
Dept: INTERNAL MEDICINE CLINIC | Age: 48
End: 2018-02-16

## 2018-02-21 ENCOUNTER — OFFICE VISIT (OUTPATIENT)
Dept: INTERNAL MEDICINE CLINIC | Age: 48
End: 2018-02-21

## 2018-02-21 VITALS
OXYGEN SATURATION: 100 % | WEIGHT: 128 LBS | TEMPERATURE: 98 F | DIASTOLIC BLOOD PRESSURE: 84 MMHG | HEIGHT: 64 IN | RESPIRATION RATE: 16 BRPM | HEART RATE: 70 BPM | SYSTOLIC BLOOD PRESSURE: 122 MMHG | BODY MASS INDEX: 21.85 KG/M2

## 2018-02-21 DIAGNOSIS — E07.9 THYROID DYSFUNCTION: Primary | ICD-10-CM

## 2018-02-21 DIAGNOSIS — R76.8 DS DNA ANTIBODY POSITIVE: ICD-10-CM

## 2018-02-21 NOTE — MR AVS SNAPSHOT
[address] [facility] [address] [facility] 
[phone] Patient: Indira Cristina MRN: [id number] :1970 Visit Information Date & Time Provider Department Dept. Phone Encounter #  
 2018  2:30 PM Madeleine Monte, [address] [place] [address],[address] [phone] [id number] Follow-up Instructions Return if symptoms worsen or fail to improve. Upcoming Health Maintenance Date Due  
 PAP AKA CERVICAL CYTOLOGY 2017 DTaP/Tdap/Td series (2 - Td) 2022 Allergies as of 2018  Review Complete On: 2018 By: Germania Bocanegra LPN Severity Noted Reaction Type Reactions Sulfa (Sulfonamide Antibiotics)  2012    Rash Current Immunizations  Reviewed on 2012 Name Date Influenza Vaccine Whole 12/3/2012 Tdap 2012 Not reviewed this visit You Were Diagnosed With   
  
 Codes Comments Thyroid dysfunction    -  Primary ICD-10-CM: E07.9 ICD-9-CM: 246.9 Ds DNA antibody positive     ICD-10-CM: R76.8 ICD-9-CM: 795.79 Vitals BP Pulse Temp Resp Height(growth percentile) Weight(growth percentile) 122/84 (BP 1 Location: Left arm, BP Patient Position: Sitting) 70 98 °F (36.7 °C) (Oral) 16 5' 4\" (1.626 m) 128 lb (58.1 kg) SpO2 BMI OB Status Smoking Status 100% 21.97 kg/m2 Having regular periods Never Smoker Vitals History BMI and BSA Data Body Mass Index Body Surface Area  
 21.97 kg/m 2 1.62 m 2 Preferred Pharmacy Pharmacy Name Phone [facility] [address] [facility], [address] [place]  [phone] Your Updated Medication List  
  
   
This list is accurate as of 18  2:55 PM.  Always use your most recent med list.  
  
  
  
  
 FLAXSEED OIL PO Take 600 mg by mouth daily. SPIRULINA Take 100 mg by mouth daily. We Performed the Following THYROGLOBULIN AB [id number] CPT(R)] THYROID ANTIBODY PANEL [94486 CPT(R)] Follow-up Instructions Return if symptoms worsen or fail to improve. Introducing [facility]! Dear Idalmis Singh: Thank you for requesting a [employer] account. Our records indicate that you already have an active [employer] account. You can access your account anytime at [email or web address] Did you know that you can access your hospital and ER discharge instructions at any time in [employer] You can also review all of your test results from your hospital stay or ER visit. Additional Information If you have questions, please visit the Frequently Asked Questions section of the [employer] website at [email or web address] Remember, [employer] is NOT to be used for urgent needs. For medical emergencies, dial 911. Now available from your iPhone and Android! Please provide this summary of care documentation to your next provider. Your primary care clinician is listed as Kurt Ring. If you have any questions after today's visit, please call [phone].

## 2018-02-21 NOTE — PROGRESS NOTES
HISTORY OF PRESENT ILLNESS  Rachel Antunez is a 51 y.o. female. HPI   Last here 7/26/17. Pt is here for routine care. Since lov, pt has emailed multiple times regarding several different issues  Her liver tests were elevated at one point  However, her repeat liver tests were nl    Pt has concerns about her thyroid function  Pt has a FMHx thyroid issues - mother, maternal aunts (3) and maternal grandmother  Her mother had hyperthyroidism, had her thyroid gland removed and takes synthroid   Pt would like her TPO and TG/80 antibodies to be tested - ordered  Discussed her thyroid function not being too slow  Discussed possibly seeing an endo  Will e-mail endo for instruction    Pt had a rash on back of neck  Pt denies having any myalgias   Pt went to a [address] for her sx  Pt was told that she had a heat rash  However, pt c/o rash on back of neck, waist, thighs and arms  Pt went to Dr. John Allen (derm) for her sx  Pt was provided with 3 creams with no improvmeent to sx  Pt also had an unremarkable skin bx completed  Today, pt states that her rash is unchanged  However, pt states that her itching is improving   Pt was taking zyzol BID and allegra BID, but stopped taking these medications  However, pt states that her rash recurred after stopping these medications     Pt was sent to Dr. Janine Leonard (allergist)  Pt completed labs, which were nl  Reviewed labs: lupus anticoagulant, Sjogren's antibodies, CRP negative; AROLDO positive; SED rate 7; CBC nl; antithyroglobulin positive; double stranded DNA nl or borderline?   Will attempt to get labs for review    Pt was then sent to Dr. Aminah Bolton (rheum)  Pt completed labs and was told that she had no lupus   However, pt had positive lab tests for lupus  I repeated some of these test for her recently  Her double stranded DNA was positive for the lupus     BP today is 122/84  Her home cuff was accurate in the past    Wt is up 6 lbs since last year  Her weight is within normal ranges     Recall fmhx breast cancer      PREVENTIVE:  Colonoscopy: 17 years ago, due 1/2019   Pap: Dr. Amaya Muhammad, 11/16, due 11/18, will schedule for 9/18   Mammogram: Dr. Amaya Muhammad' office, 5/17, will schedule for 9/18   DEXA: not yet needed  Tdap: 12/19/2012  Pneumovax: not yet needed  Lolly Nian: not yet needed  Zostavax: not yet needed  Flu shot: declines  Eye exam: Dr. Audie Hodgkins, Summer 2017  Lipids: 7/17 LDL 87    Patient Active Problem List    Diagnosis Date Noted    Fibrocystic breast disease 07/29/2016    FH: diabetes mellitus 05/12/2015    Benign breast lumps 12/19/2012    Back skin lesion 12/19/2012    H/O bilateral breast implants 12/19/2012     Current Outpatient Prescriptions   Medication Sig Dispense Refill    BLUE-GREEN ALGAE (SPIRULINA) Take 100 mg by mouth daily.  FLAXSEED OIL PO Take 600 mg by mouth daily.  levocetirizine (XYZAL) 5 mg tablet Take 1 Tab by mouth daily. 30 Tab 3    fexofenadine (ALLEGRA) 180 mg tablet Take 1 Tab by mouth daily. 30 Tab 3    fexofenadine (ALLEGRA ALLERGY) 180 mg tablet Take 1 Tab by mouth daily.  30 Tab 0    rizatriptan (MAXALT) 5 mg tablet TAKE ONE TABLET BY MOUTH ONCE AS NEEDED FOR MIGRAINE FOR UP TO 1 DOSE. MAY REPEAT IN 2 HOURS AS NEEDED  0     Past Surgical History:   Procedure Laterality Date    HC PACK LASER CONE  1992    HX BREAST AUGMENTATION  1998    Saline implants    HX BREAST BIOPSY  x3 -L    2 cysts removed and 1 \"tumor\" removed in the past    HX CYST REMOVAL      HX GYN      removal of 1 ovary and cyst    HX KNEE ARTHROSCOPY  2002    left    HX OTHER SURGICAL      laser surgery for dysplasia    HX OTHER SURGICAL  2010    rectal     HX OTHER SURGICAL      cyst removal from ovaries      Lab Results  Component Value Date/Time   WBC 4.9 07/26/2017 01:38 PM   HGB 13.2 07/26/2017 01:38 PM   Hemoglobin (POC) 14.5 06/14/2016 09:26 AM   HCT 38.3 07/26/2017 01:38 PM   PLATELET 725 22/39/9290 01:38 PM   MCV 87 07/26/2017 01:38 PM     Lab Results  Component Value Date/Time   Cholesterol, total 171 07/26/2017 01:38 PM   HDL Cholesterol 73 07/26/2017 01:38 PM   LDL, calculated 87 07/26/2017 01:38 PM   Triglyceride 54 07/26/2017 01:38 PM     Lab Results  Component Value Date/Time   GFR est non- 07/26/2017 01:38 PM   GFR est  07/26/2017 01:38 PM   Creatinine 0.61 07/26/2017 01:38 PM   BUN 10 07/26/2017 01:38 PM   Sodium 143 07/26/2017 01:38 PM   Potassium 4.0 07/26/2017 01:38 PM   Chloride 105 07/26/2017 01:38 PM   CO2 25 07/26/2017 01:38 PM        Review of Systems   Respiratory: Negative for shortness of breath. Cardiovascular: Negative for chest pain. Musculoskeletal: Negative for myalgias. Skin: Positive for itching and rash. Physical Exam   Constitutional: She is oriented to person, place, and time. She appears well-developed and well-nourished. No distress. HENT:   Head: Normocephalic and atraumatic. Eyes: Conjunctivae and EOM are normal. Right eye exhibits no discharge. Left eye exhibits no discharge. Neck: Normal range of motion. Neck supple. Cardiovascular: Normal rate, regular rhythm and normal heart sounds. Exam reveals no gallop and no friction rub. No murmur heard. Pulmonary/Chest: Effort normal and breath sounds normal. No respiratory distress. She has no wheezes. She has no rales. She exhibits no tenderness. Musculoskeletal: Normal range of motion. She exhibits no edema, tenderness or deformity. Lymphadenopathy:     She has no cervical adenopathy. Neurological: She is alert and oriented to person, place, and time. Coordination normal.   Skin: Skin is warm and dry. Rash noted. She is not diaphoretic. There is erythema (Slight near L breast). No pallor. Psychiatric: She has a normal mood and affect. Her behavior is normal.       ASSESSMENT and PLAN    ICD-10-CM ICD-9-CM    1.  Thyroid dysfunction    Pt with nl TSH and free T4 but positive thyroid antibodies, of note she also has a positive double stranded DNA of unclear significance, she has a FMHx hyperthyroidism, will repeat antibody test and email endo to determine if they would like to see her or if this should just be followed   E07.9 246.9 THYROID ANTIBODY PANEL      THYROGLOBULIN AB   2. Ds DNA antibody positive    See above, pt also has a recurrent rash which she is treating with zyzol and allegra BID, will have her contact her rheum to discuss this further   R76.8 795.79         Depression screen reviewed and negative. Scribed by Crow [place] of [facility], as dictated by Dr. Randy Jiménez. Current diagnosis and concerns discussed with pt at length. Pt understands risks and benefits or current treatment plan and medications, and accepts the treatment and medication with any possible risks. Pt asks appropriate questions, which were answered. Pt was instructed to call with any concerns or problems. This note will not be viewable in [address]

## 2018-02-22 ENCOUNTER — TELEPHONE (OUTPATIENT)
Dept: INTERNAL MEDICINE CLINIC | Age: 48
End: 2018-02-22

## 2018-02-22 LAB
THYROGLOB AB SERPL-ACNC: 1 IU/ML (ref 0–0.9)
THYROPEROXIDASE AB SERPL-ACNC: 45 IU/ML (ref 0–34)

## 2018-02-22 NOTE — TELEPHONE ENCOUNTER
Called, spoke to pt. Two pt identifiers confirmed. Pt informed of lab results. See recent lab result encounter. Pt asking if it may be r/t Lupus. Pt informed as of 2/19/18, labs faxed to [facility] (Rheum). Pt advised to see if they received labs and have any further recommendations. Pt asking what to do for her rash until then. Pt informed Dr. Desi Kay will be notified. Pt verbalized understanding of information discussed w/ no further questions at this time.

## 2018-02-22 NOTE — PROGRESS NOTES
I wanted to let you know that the antibodies are still positive. I discussed this with endocrinology at this point will  just monitor. A good number of people will have a positive thyroid antibody, but not develop any thyroid symptoms. Some will develop symptoms years later.      Without  abnormal thyroid levels, we should monitor every 6 month TSH levels

## 2018-02-22 NOTE — PROGRESS NOTES
Called, spoke to pt. Two pt identifiers confirmed. Pt informed per Dr. Raphael Wood that the antibodies are still positive-discussed this with endocrinology at this point will  just monitor. Pt informed per Dr. Raphael Wood a good number of people will have a positive thyroid antibody, but not develop any thyroid symptoms. Some will develop symptoms years later. Pt informed per Dr. Raphael Wood w/out abnormal thyroid levels, we should monitor every 6 month TSH levels. Pt verbalized understanding of information discussed w/ no further questions at this time.

## 2018-02-22 NOTE — TELEPHONE ENCOUNTER
MD Max Daigle LPN        Caller: Unspecified (Today, 11:20 AM)                     Cont antihistamine

## 2018-06-21 ENCOUNTER — OFFICE VISIT (OUTPATIENT)
Dept: PRIMARY CARE CLINIC | Age: 48
End: 2018-06-21

## 2018-06-21 VITALS
HEART RATE: 70 BPM | TEMPERATURE: 98.2 F | RESPIRATION RATE: 14 BRPM | HEIGHT: 64 IN | SYSTOLIC BLOOD PRESSURE: 137 MMHG | WEIGHT: 129.8 LBS | DIASTOLIC BLOOD PRESSURE: 88 MMHG | OXYGEN SATURATION: 98 % | BODY MASS INDEX: 22.16 KG/M2

## 2018-06-21 DIAGNOSIS — M79.644 PAIN OF RIGHT THUMB: Primary | ICD-10-CM

## 2018-06-21 RX ORDER — ETONOGESTREL AND ETHINYL ESTRADIOL .12; .015 MG/D; MG/D
INSERT, EXTENDED RELEASE VAGINAL
COMMUNITY
Start: 2018-06-18 | End: 2018-12-31

## 2018-06-21 NOTE — MR AVS SNAPSHOT
[facility] 
 
 
 [address] [place] [address] 
[phone] Patient: Krunal Boss MRN: [id number] :1970 Visit Information Date & Time Provider Department Dept. Phone Encounter #  
 2018  6:00 PM Ria Perez, [facility] [address] [place] [id number] Follow-up Instructions Return if symptoms worsen or fail to improve. Your Appointments 2018 10:30 AM  
PHYSICAL with Arin Bates, [address] [place] [address]) Appt Note: CPE sc 18  
 [facility] [address] [id number]  
[address] [facility] Upcoming Health Maintenance Date Due  
 PAP AKA CERVICAL CYTOLOGY 2017 Influenza Age 5 to Adult 2018 DTaP/Tdap/Td series (2 - Td) 2022 Allergies as of 2018  Review Complete On: 2018 By: Mickie Cerrato LPN Severity Noted Reaction Type Reactions Sulfa (Sulfonamide Antibiotics)  2012    Rash Current Immunizations  Reviewed on 2012 Name Date Influenza Vaccine Whole 12/3/2012 Tdap 2012 Not reviewed this visit You Were Diagnosed With   
  
 Codes Comments Pain of right thumb    -  Primary ICD-10-CM: J75.369 ICD-9-CM: 729.5 Vitals BP Pulse Temp Resp Height(growth percentile) Weight(growth percentile) 137/88 (BP 1 Location: Left arm, BP Patient Position: Sitting) 70 98.2 °F (36.8 °C) (Oral) 14 5' 4\" (1.626 m) 129 lb 12.8 oz (58.9 kg) SpO2 BMI OB Status Smoking Status 98% 22.28 kg/m2 Having regular periods Never Smoker Vitals History BMI and BSA Data Body Mass Index Body Surface Area  
 22.28 kg/m 2 1.63 m 2 Preferred Pharmacy Pharmacy Name Phone [facility] [address], [address] [phone] Your Updated Medication List  
  
   
This list is accurate as of 6/21/18  6:40 PM.  Always use your most recent med list.  
  
  
  
  
 FLAXSEED OIL PO Take 600 mg by mouth daily. NUVARING 0.12-0.015 mg/24 hr vaginal ring Generic drug:  ethinyl estradiol-etonogestrel SPIRULINA Take 100 mg by mouth daily. Follow-up Instructions Return if symptoms worsen or fail to improve. To-Do List   
 06/21/2018 Imaging:  XR THUMB RT MIN 2 V Patient Instructions Hand Pain: Care Instructions Your Care Instructions Common causes of hand pain are overuse and injuries, such as might happen during sports or home repair projects. Everyday wear and tear, especially as you get older, also can cause hand pain. Most minor hand injuries will heal on their own, and home treatment is usually all you need to do. If you have sudden and severe pain, you may need tests and treatment. Follow-up care is a key part of your treatment and safety. Be sure to make and go to all appointments, and call your doctor if you are having problems. It's also a good idea to know your test results and keep a list of the medicines you take. How can you care for yourself at home? · Take pain medicines exactly as directed. ¨ If the doctor gave you a prescription medicine for pain, take it as prescribed. ¨ If you are not taking a prescription pain medicine, ask your doctor if you can take an over-the-counter medicine. · Rest and protect your hand. Take a break from any activity that may cause pain. · Put ice or a cold pack on your hand for 10 to 20 minutes at a time. Put a thin cloth between the ice and your skin. · Prop up the sore hand on a pillow when you ice it or anytime you sit or lie down during the next 3 days. Try to keep it above the level of your heart. This will help reduce swelling.  
· If your doctor recommends a sling, splint, or elastic bandage to support your hand, wear it as directed. When should you call for help? Call 911 anytime you think you may need emergency care. For example, call if: 
? · Your hand turns cool or pale or changes color. ?Call your doctor now or seek immediate medical care if: 
? · You cannot move your hand. ? · Your hand pops, moves out of its normal position, and then returns to its normal position. ? · You have signs of infection, such as: 
¨ Increased pain, swelling, warmth, or redness. ¨ Red streaks leading from the sore area. ¨ Pus draining from a place on your hand. ¨ A fever. ? · Your hand feels numb or tingly. ? Watch closely for changes in your health, and be sure to contact your doctor if: 
? · Your hand feels unstable when you try to use it. ? · You do not get better as expected. ? · You have any new symptoms, such as swelling. ? · Bruises from an injury to your hand last longer than 2 weeks. Where can you learn more? Go to http://sim-mahesh.info/. Enter R273 in the search box to learn more about \"Hand Pain: Care Instructions. \" Current as of: March 20, 2017 Content Version: 11.4 © 0837-2286 dev9k. Care instructions adapted under license by [employer] (which disclaims liability or warranty for this information). If you have questions about a medical condition or this instruction, always ask your healthcare professional. [place] [address] any warranty or liability for your use of this information. Hand Sprain: Care Instructions Your Care Instructions A hand sprain occurs when you stretch or tear a ligament in your hand. Ligaments are the tough tissues that connect one bone to another. Most hand sprains will heal with treatment you can do at home. Follow-up care is a key part of your treatment and safety.  Be sure to make and go to all appointments, and call your doctor if you are having problems. It's also a good idea to know your test results and keep a list of the medicines you take. How can you care for yourself at home? · If your doctor gave you a splint or immobilizer, wear it as directed. This will help keep swelling down and help your hand heal. 
· Follow your doctor's directions for exercise and other activity. · For the first 2 days after your injury, avoid things that might increase swelling, such as hot showers, hot tubs, or hot packs. · Put ice or a cold pack on your hand for 10 to 20 minutes at a time to stop swelling. Try this every 1 to 2 hours for 3 days (when you are awake) or until the swelling goes down. Put a thin cloth between the ice pack and your skin. Keep your splint dry. · After 2 or 3 days, if your swelling is gone, put a heating pad (set on low) or a warm cloth on your hand. Some experts suggest that you go back and forth between hot and cold treatments. · Prop up your hand on a pillow when you ice it or anytime you sit or lie down. Try to keep it above the level of your heart. This will help reduce swelling. · Take pain medicines exactly as directed. ¨ If the doctor gave you a prescription medicine for pain, take it as prescribed. ¨ If you are not taking a prescription pain medicine, ask your doctor if you can take an over-the-counter medicine. · Return to your usual level of activity slowly. When should you call for help? Call your doctor now or seek immediate medical care if: 
? · Your pain is worse. ? · You have new or increased swelling in your hand. ? · You cannot move your hand. ? · You have tingling, weakness, or numbness in your hand or fingers. ? · Your hand or fingers are cool or pale or change color. ? · You have a fever. ? · Your hand or fingers are red. ? Watch closely for changes in your health, and be sure to contact your doctor if: 
? · Your hand does not get better as expected. Where can you learn more? Go to http://sim-mahesh.info/. Enter T392 in the search box to learn more about \"Hand Sprain: Care Instructions. \" Current as of: March 21, 2017 Content Version: 11.4 © 3405-0310 ReSnap. Care instructions adapted under license by [employer] (which disclaims liability or warranty for this information). If you have questions about a medical condition or this instruction, always ask your healthcare professional. [address] any warranty or liability for your use of this information. Introducing \[facility] [place] [facility]\"" & HEALTH SERVICES! Dear Catherine Hayes: Thank you for requesting a [employer] account. Our records indicate that you already have an active [employer] account. You can access your account anytime at [email or web address]. [employer] Did you know that you can access your hospital and ER discharge instructions at any time in [employer]? You can also review all of your test results from your hospital stay or ER visit. Additional Information If you have questions, please visit the Frequently Asked Questions section of the [employer] website at [email or web address]. [employer]/. Remember, [employer] is NOT to be used for urgent needs. For medical emergencies, dial 911. Now available from your iPhone and Android! Please provide this summary of care documentation to your next provider. Your primary care clinician is listed as Mason Andrew. If you have any questions after today's visit, please call [phone].

## 2018-06-21 NOTE — PATIENT INSTRUCTIONS
Hand Pain: Care Instructions  Your Care Instructions    Common causes of hand pain are overuse and injuries, such as might happen during sports or home repair projects. Everyday wear and tear, especially as you get older, also can cause hand pain. Most minor hand injuries will heal on their own, and home treatment is usually all you need to do. If you have sudden and severe pain, you may need tests and treatment. Follow-up care is a key part of your treatment and safety. Be sure to make and go to all appointments, and call your doctor if you are having problems. It's also a good idea to know your test results and keep a list of the medicines you take. How can you care for yourself at home? · Take pain medicines exactly as directed. ¨ If the doctor gave you a prescription medicine for pain, take it as prescribed. ¨ If you are not taking a prescription pain medicine, ask your doctor if you can take an over-the-counter medicine. · Rest and protect your hand. Take a break from any activity that may cause pain. · Put ice or a cold pack on your hand for 10 to 20 minutes at a time. Put a thin cloth between the ice and your skin. · Prop up the sore hand on a pillow when you ice it or anytime you sit or lie down during the next 3 days. Try to keep it above the level of your heart. This will help reduce swelling. · If your doctor recommends a sling, splint, or elastic bandage to support your hand, wear it as directed. When should you call for help? Call 911 anytime you think you may need emergency care. For example, call if:  ? · Your hand turns cool or pale or changes color. ?Call your doctor now or seek immediate medical care if:  ? · You cannot move your hand. ? · Your hand pops, moves out of its normal position, and then returns to its normal position. ? · You have signs of infection, such as:  ¨ Increased pain, swelling, warmth, or redness. ¨ Red streaks leading from the sore area.   ¨ Pus draining from a place on your hand. ¨ A fever. ? · Your hand feels numb or tingly. ? Watch closely for changes in your health, and be sure to contact your doctor if:  ? · Your hand feels unstable when you try to use it. ? · You do not get better as expected. ? · You have any new symptoms, such as swelling. ? · Bruises from an injury to your hand last longer than 2 weeks. Where can you learn more? Go to http://sim-mahesh.info/. Enter R273 in the search box to learn more about \"Hand Pain: Care Instructions. \"  Current as of: March 20, 2017  Content Version: 11.4  © 5547-6564 Neo PLM. Care instructions adapted under license by [employer] (which disclaims liability or warranty for this information). If you have questions about a medical condition or this instruction, always ask your healthcare professional. [place] [address] any warranty or liability for your use of this information. Hand Sprain: Care Instructions  Your Care Instructions  A hand sprain occurs when you stretch or tear a ligament in your hand. Ligaments are the tough tissues that connect one bone to another. Most hand sprains will heal with treatment you can do at home. Follow-up care is a key part of your treatment and safety. Be sure to make and go to all appointments, and call your doctor if you are having problems. It's also a good idea to know your test results and keep a list of the medicines you take. How can you care for yourself at home? · If your doctor gave you a splint or immobilizer, wear it as directed. This will help keep swelling down and help your hand heal.  · Follow your doctor's directions for exercise and other activity. · For the first 2 days after your injury, avoid things that might increase swelling, such as hot showers, hot tubs, or hot packs. · Put ice or a cold pack on your hand for 10 to 20 minutes at a time to stop swelling.  Try this every 1 to 2 hours for 3 days (when you are awake) or until the swelling goes down. Put a thin cloth between the ice pack and your skin. Keep your splint dry. · After 2 or 3 days, if your swelling is gone, put a heating pad (set on low) or a warm cloth on your hand. Some experts suggest that you go back and forth between hot and cold treatments. · Prop up your hand on a pillow when you ice it or anytime you sit or lie down. Try to keep it above the level of your heart. This will help reduce swelling. · Take pain medicines exactly as directed. ¨ If the doctor gave you a prescription medicine for pain, take it as prescribed. ¨ If you are not taking a prescription pain medicine, ask your doctor if you can take an over-the-counter medicine. · Return to your usual level of activity slowly. When should you call for help? Call your doctor now or seek immediate medical care if:  ? · Your pain is worse. ? · You have new or increased swelling in your hand. ? · You cannot move your hand. ? · You have tingling, weakness, or numbness in your hand or fingers. ? · Your hand or fingers are cool or pale or change color. ? · You have a fever. ? · Your hand or fingers are red. ? Watch closely for changes in your health, and be sure to contact your doctor if:  ? · Your hand does not get better as expected. Where can you learn more? Go to http://sim-mahesh.info/. Enter D234 in the search box to learn more about \"Hand Sprain: Care Instructions. \"  Current as of: March 21, 2017  Content Version: 11.4  © 1789-9924 Celcuity. Care instructions adapted under license by [employer] (which disclaims liability or warranty for this information). If you have questions about a medical condition or this instruction, always ask your healthcare professional. [address] any warranty or liability for your use of this information.

## 2018-06-21 NOTE — PROGRESS NOTES
Pt complains of right hand pain. Pt states that she was watching her sons dog last week while he was out of town. She states that she took the dog out on his leash and wraps the leash around her hand. She states the dog saw a squirrel and ran which made the leash pull on her thumb.   Pt states that she has shooting pains from her hand up her wrist.

## 2018-08-20 ENCOUNTER — OFFICE VISIT (OUTPATIENT)
Dept: INTERNAL MEDICINE CLINIC | Age: 48
End: 2018-08-20

## 2018-08-20 VITALS
BODY MASS INDEX: 21.85 KG/M2 | HEIGHT: 64 IN | WEIGHT: 128 LBS | SYSTOLIC BLOOD PRESSURE: 126 MMHG | HEART RATE: 66 BPM | DIASTOLIC BLOOD PRESSURE: 78 MMHG | RESPIRATION RATE: 16 BRPM | TEMPERATURE: 98 F | OXYGEN SATURATION: 97 %

## 2018-08-20 DIAGNOSIS — M79.641 HAND PAIN, RIGHT: ICD-10-CM

## 2018-08-20 DIAGNOSIS — Z00.00 PHYSICAL EXAM, ANNUAL: Primary | ICD-10-CM

## 2018-08-20 NOTE — PROGRESS NOTES
HISTORY OF PRESENT ILLNESS  Gigi Elder is a 51 y.o. female. HPI  Last here 2/21/18. Pt is here for routine care.     Lov, pt has concerns about her thyroid function  Pt has a FMHx thyroid issues - mother, maternal aunts (3) and maternal grandmother     Lov, pt c/o rash  Pt went to Dr. Dannie Sumner (derm) for her sx  Pt states that her sx are d/t eczema and improved on zyrtec and cream      Pt was sent to Dr. Alexa Kocher (rheum)  Pt completed labs and was told that she had no lupus   However, pt had positive lab tests for lupus  Her double stranded DNA was positive for the lupus  Discussed this again today      BP today is 149/85, will repeat this today--better does not monitor at home      Wt is stable since last year   Her weight is within normal ranges    Will get labs today    Pt takes zyrtec at night    Pt c/o red line on her thumb  Discussed probably being nl discoloration    Pt came to [facility] clinic after a dog jerked her R hand  Pt states she is still having weakness with her   Reviewed XR: arthritis  Pt states she gets shooting pains up into the wrist  Advised carpal tunnel wrist splint      Recall fmhx breast cancer       PREVENTIVE:  Colonoscopy: 17 years ago, due 1/2019, pt would like a female physician  Pap: Dr. Roselyn Edmonds, 11/16, due 11/18, scheduled for 9/18 with Dr João Mcghee: Dr. Roselyn Edmonds' office, 5/17, scheduled for 9/18   DEXA: not yet needed  Tdap: 12/19/2012  Pneumovax: not yet needed  [id number]: not yet needed  Shingrix: not yet needed  Flu shot: declines  Eye exam: Dr. Linh Benson, Summer 2017  Lipids: 7/17 LDL 87    Patient Active Problem List    Diagnosis Date Noted    Fibrocystic breast disease 07/29/2016    FH: diabetes mellitus 05/12/2015    Benign breast lumps 12/19/2012    Back skin lesion 12/19/2012    H/O bilateral breast implants 12/19/2012     Current Outpatient Prescriptions   Medication Sig Dispense Refill    cholecalciferol, vitamin D3, (VITAMIN D3 PO) Take  by mouth.       NUVARING 0.12-0.015 mg/24 hr vaginal ring       BLUE-GREEN ALGAE (SPIRULINA) Take 100 mg by mouth daily.  FLAXSEED OIL PO Take 600 mg by mouth daily. Past Surgical History:   Procedure Laterality Date    HC PACK LASER CONE  1992    HX BREAST AUGMENTATION  1998    Saline implants    HX BREAST BIOPSY  x3 -L    2 cysts removed and 1 \"tumor\" removed in the past    HX CYST REMOVAL      HX GYN      removal of 1 ovary and cyst    HX KNEE ARTHROSCOPY  2002    left    HX OTHER SURGICAL      laser surgery for dysplasia    HX OTHER SURGICAL  2010    rectal     HX OTHER SURGICAL      cyst removal from ovaries      Lab Results  Component Value Date/Time   WBC 4.9 07/26/2017 01:38 PM   HGB 13.2 07/26/2017 01:38 PM   Hemoglobin (POC) 14.5 06/14/2016 09:26 AM   HCT 38.3 07/26/2017 01:38 PM   PLATELET 129 12/65/5784 01:38 PM   MCV 87 07/26/2017 01:38 PM     Lab Results  Component Value Date/Time   Cholesterol, total 171 07/26/2017 01:38 PM   HDL Cholesterol 73 07/26/2017 01:38 PM   LDL, calculated 87 07/26/2017 01:38 PM   Triglyceride 54 07/26/2017 01:38 PM     Lab Results  Component Value Date/Time   GFR est non- 07/26/2017 01:38 PM   GFR est  07/26/2017 01:38 PM   Creatinine 0.61 07/26/2017 01:38 PM   BUN 10 07/26/2017 01:38 PM   Sodium 143 07/26/2017 01:38 PM   Potassium 4.0 07/26/2017 01:38 PM   Chloride 105 07/26/2017 01:38 PM   CO2 25 07/26/2017 01:38 PM        Review of Systems   Respiratory: Negative for shortness of breath. Cardiovascular: Negative for chest pain. Physical Exam   Constitutional: She is oriented to person, place, and time. She appears well-developed and well-nourished. No distress. HENT:   Head: Normocephalic and atraumatic. Right Ear: External ear normal.   Left Ear: External ear normal.   Mouth/Throat: Oropharynx is clear and moist. No oropharyngeal exudate. Eyes: Conjunctivae and EOM are normal. Pupils are equal, round, and reactive to light.  Right eye exhibits no discharge. Left eye exhibits no discharge. No scleral icterus. Neck: Normal range of motion. Neck supple. No carotid bruits   Cardiovascular: Normal rate, regular rhythm, normal heart sounds and intact distal pulses. Exam reveals no gallop and no friction rub. No murmur heard. Pulmonary/Chest: Effort normal and breath sounds normal. No respiratory distress. She has no wheezes. She has no rales. She exhibits no tenderness. Abdominal: Soft. She exhibits no distension. There is no tenderness. There is no rebound and no guarding. Musculoskeletal: Normal range of motion. She exhibits no edema, tenderness or deformity. Lymphadenopathy:     She has no cervical adenopathy. Neurological: She is alert and oriented to person, place, and time. Coordination normal.   Skin: Skin is warm and dry. No rash noted. She is not diaphoretic. No erythema. No pallor. Psychiatric: She has a normal mood and affect. Her behavior is normal.       ASSESSMENT and PLAN    ICD-10-CM ICD-9-CM    1. Physical exam, annual    Nl wt, nl BP, COLO due age 47, pelvic and mammo scheduled, declines flu shot, tdap UTD, labs ordered   Z00.00 V70.0 REFERRAL TO GASTROENTEROLOGY      LIPID PANEL      METABOLIC PANEL, COMPREHENSIVE      CBC W/O DIFF      TSH 3RD GENERATION      T4, FREE      HEMOGLOBIN A1C WITH EAG      THYROID ANTIBODY PANEL   2. Hand pain, right    Possible carpal tunnel syndrome, provided wrist splint   M79.641 729.5 AMB SUPPLY ORDER      Depression screen reviewed and negative. Scribed by Hany Cadet of [facility], as dictated by Dr. Augusto Meza. Current diagnosis and concerns discussed with pt at length. Pt understands risks and benefits or current treatment plan and medications, and accepts the treatment and medication with any possible risks. Pt asks appropriate questions, which were answered. Pt was instructed to call with any concerns or problems.   This note will not be viewable in MyChart.

## 2018-08-21 LAB
ALBUMIN SERPL-MCNC: 4.2 G/DL (ref 3.5–5.5)
ALBUMIN/GLOB SERPL: 1.4 {RATIO} (ref 1.2–2.2)
ALP SERPL-CCNC: 54 IU/L (ref 39–117)
ALT SERPL-CCNC: 17 IU/L (ref 0–32)
AST SERPL-CCNC: 21 IU/L (ref 0–40)
BILIRUB SERPL-MCNC: 0.3 MG/DL (ref 0–1.2)
BUN SERPL-MCNC: 9 MG/DL (ref 6–24)
BUN/CREAT SERPL: 13 (ref 9–23)
CALCIUM SERPL-MCNC: 8.7 MG/DL (ref 8.7–10.2)
CHLORIDE SERPL-SCNC: 105 MMOL/L (ref 96–106)
CHOLEST SERPL-MCNC: 199 MG/DL (ref 100–199)
CO2 SERPL-SCNC: 18 MMOL/L (ref 20–29)
CREAT SERPL-MCNC: 0.71 MG/DL (ref 0.57–1)
ERYTHROCYTE [DISTWIDTH] IN BLOOD BY AUTOMATED COUNT: 12.9 % (ref 12.3–15.4)
EST. AVERAGE GLUCOSE BLD GHB EST-MCNC: 85 MG/DL
GLOBULIN SER CALC-MCNC: 2.9 G/DL (ref 1.5–4.5)
GLUCOSE SERPL-MCNC: 70 MG/DL (ref 65–99)
HBA1C MFR BLD: 4.6 % (ref 4.8–5.6)
HCT VFR BLD AUTO: 37.3 % (ref 34–46.6)
HDLC SERPL-MCNC: 84 MG/DL
HGB BLD-MCNC: 12.4 G/DL (ref 11.1–15.9)
LDLC SERPL CALC-MCNC: 100 MG/DL (ref 0–99)
MCH RBC QN AUTO: 28.6 PG (ref 26.6–33)
MCHC RBC AUTO-ENTMCNC: 33.2 G/DL (ref 31.5–35.7)
MCV RBC AUTO: 86 FL (ref 79–97)
PLATELET # BLD AUTO: 274 X10E3/UL (ref 150–379)
POTASSIUM SERPL-SCNC: 3.9 MMOL/L (ref 3.5–5.2)
PROT SERPL-MCNC: 7.1 G/DL (ref 6–8.5)
RBC # BLD AUTO: 4.33 X10E6/UL (ref 3.77–5.28)
SODIUM SERPL-SCNC: 140 MMOL/L (ref 134–144)
T4 FREE SERPL-MCNC: 1.35 NG/DL (ref 0.82–1.77)
THYROGLOB AB SERPL-ACNC: <1 IU/ML (ref 0–0.9)
THYROPEROXIDASE AB SERPL-ACNC: 73 IU/ML (ref 0–34)
TRIGL SERPL-MCNC: 74 MG/DL (ref 0–149)
TSH SERPL DL<=0.005 MIU/L-ACNC: 0.69 UIU/ML (ref 0.45–4.5)
VLDLC SERPL CALC-MCNC: 15 MG/DL (ref 5–40)
WBC # BLD AUTO: 4.5 X10E3/UL (ref 3.4–10.8)

## 2018-09-26 ENCOUNTER — OFFICE VISIT (OUTPATIENT)
Dept: URGENT CARE | Age: 48
End: 2018-09-26

## 2018-09-26 VITALS
HEIGHT: 63 IN | TEMPERATURE: 98.6 F | DIASTOLIC BLOOD PRESSURE: 83 MMHG | WEIGHT: 126 LBS | BODY MASS INDEX: 22.32 KG/M2 | SYSTOLIC BLOOD PRESSURE: 126 MMHG | OXYGEN SATURATION: 99 % | HEART RATE: 78 BPM | RESPIRATION RATE: 16 BRPM

## 2018-09-26 DIAGNOSIS — J02.9 SORE THROAT: ICD-10-CM

## 2018-09-26 DIAGNOSIS — H92.03 OTALGIA OF BOTH EARS: Primary | ICD-10-CM

## 2018-09-26 LAB
S PYO AG THROAT QL: NEGATIVE
VALID INTERNAL CONTROL?: YES

## 2018-09-26 RX ORDER — FLUTICASONE PROPIONATE 50 MCG
2 SPRAY, SUSPENSION (ML) NASAL DAILY
Qty: 1 BOTTLE | Refills: 0 | Status: SHIPPED | OUTPATIENT
Start: 2018-09-26 | End: 2018-10-10

## 2018-09-26 NOTE — PATIENT INSTRUCTIONS
Sore Throat: Care Instructions  Your Care Instructions    Infection by bacteria or a virus causes most sore throats. Cigarette smoke, dry air, air pollution, allergies, and yelling can also cause a sore throat. Sore throats can be painful and annoying. Fortunately, most sore throats go away on their own. If you have a bacterial infection, your doctor may prescribe antibiotics. Follow-up care is a key part of your treatment and safety. Be sure to make and go to all appointments, and call your doctor if you are having problems. It's also a good idea to know your test results and keep a list of the medicines you take. How can you care for yourself at home? · If your doctor prescribed antibiotics, take them as directed. Do not stop taking them just because you feel better. You need to take the full course of antibiotics. · Gargle with warm salt water once an hour to help reduce swelling and relieve discomfort. Use 1 teaspoon of salt mixed in 1 cup of warm water. · Take an over-the-counter pain medicine, such as acetaminophen (Tylenol), ibuprofen (Advil, Motrin), or naproxen (Aleve). Read and follow all instructions on the label. · Be careful when taking over-the-counter cold or flu medicines and Tylenol at the same time. Many of these medicines have acetaminophen, which is Tylenol. Read the labels to make sure that you are not taking more than the recommended dose. Too much acetaminophen (Tylenol) can be harmful. · Drink plenty of fluids. Fluids may help soothe an irritated throat. Hot fluids, such as tea or soup, may help decrease throat pain. · Use over-the-counter throat lozenges to soothe pain. Regular cough drops or hard candy may also help. These should not be given to young children because of the risk of choking. · Do not smoke or allow others to smoke around you. If you need help quitting, talk to your doctor about stop-smoking programs and medicines.  These can increase your chances of quitting for good. · Use a vaporizer or humidifier to add moisture to your bedroom. Follow the directions for cleaning the machine. When should you call for help? Call your doctor now or seek immediate medical care if:    · You have new or worse trouble swallowing.     · Your sore throat gets much worse on one side.    Watch closely for changes in your health, and be sure to contact your doctor if you do not get better as expected. Where can you learn more? Go to http://sim-mahesh.info/. Enter 062 441 80 19 in the search box to learn more about \"Sore Throat: Care Instructions. \"  Current as of: May 12, 2017  Content Version: 11.7  © 6921-0517 [employer]. Care instructions adapted under license by [employer] (which disclaims liability or warranty for this information). If you have questions about a medical condition or this instruction, always ask your healthcare professional. [address] any warranty or liability for your use of this information.

## 2018-09-26 NOTE — MR AVS SNAPSHOT
[address] [place] [address] 
[phone] Patient: Randall Jernigan MRN: [id number] :1970 Visit Information Date & Time Provider Department Dept. Phone Encounter #  
 2018 12:30 PM [address] [facility] [phone] [id number] Your Appointments 2019  9:15 AM  
ROUTINE CARE with Alcon Odonnell, [address] [facility] Appt Note: routine f/u w/fasting labs [address] [address]  
[address] [place] [address] Upcoming Health Maintenance Date Due  
 PAP AKA CERVICAL CYTOLOGY 2017 Influenza Age 5 to Adult 7/15/2019* DTaP/Tdap/Td series (2 - Td) 2022 *Topic was postponed. The date shown is not the original due date. Allergies as of 2018  Review Complete On: 2018 By: Timmy Connolly RN Severity Noted Reaction Type Reactions Sulfa (Sulfonamide Antibiotics)  2012    Rash Current Immunizations  Reviewed on 2012 Name Date Influenza Vaccine Whole 12/3/2012 Tdap 2012 Not reviewed this visit You Were Diagnosed With   
  
 Codes Comments Otalgia of both ears    -  Primary ICD-10-CM: H92.03 
ICD-9-CM: 388.70 Sore throat     ICD-10-CM: J02.9 ICD-9-CM: 089 Vitals BP Pulse Temp Resp Height(growth percentile) Weight(growth percentile) 126/83 78 98.6 °F (37 °C) 16 5' 3\" (1.6 m) 126 lb (57.2 kg) SpO2 BMI OB Status Smoking Status 99% 22.32 kg/m2 Having regular periods Never Smoker BMI and BSA Data Body Mass Index Body Surface Area  
 22.32 kg/m 2 1.59 m 2 Preferred Pharmacy Pharmacy Name Phone [facility] [address], [address] [phone] Your Updated Medication List  
  
   
 This list is accurate as of 18  1:13 PM.  Always use your most recent med list.  
  
  
  
  
 FLAXSEED OIL PO Take 600 mg by mouth daily. fluticasone 50 mcg/actuation nasal spray Commonly known as:  Joy Gut 2 Sprays by Both Nostrils route daily for 14 days. NUVARING 0.12-0.015 mg/24 hr vaginal ring Generic drug:  ethinyl estradiol-etonogestrel SPIRULINA Take 100 mg by mouth daily. VITAMIN D3 PO Take  by mouth. Prescriptions Sent to Pharmacy Refills  
 fluticasone (FLONASE) 50 mcg/actuation nasal spray 0 Si Sprays by Both Nostrils route daily for 14 days. Class: Normal  
 Pharmacy: [facility]  [address]  Ph #: [phone] Route: Both Nostrils We Performed the Following AMB POC RAPID STREP A [56065 CPT(R)] UPPER RESPIRATORY CULTURE Q1005015 CPT(R)] Patient Instructions Sore Throat: Care Instructions Your Care Instructions Infection by bacteria or a virus causes most sore throats. Cigarette smoke, dry air, air pollution, allergies, and yelling can also cause a sore throat. Sore throats can be painful and annoying. Fortunately, most sore throats go away on their own. If you have a bacterial infection, your doctor may prescribe antibiotics. Follow-up care is a key part of your treatment and safety. Be sure to make and go to all appointments, and call your doctor if you are having problems. It's also a good idea to know your test results and keep a list of the medicines you take. How can you care for yourself at home? · If your doctor prescribed antibiotics, take them as directed. Do not stop taking them just because you feel better. You need to take the full course of antibiotics. · Gargle with warm salt water once an hour to help reduce swelling and relieve discomfort. Use 1 teaspoon of salt mixed in 1 cup of warm water. · Take an over-the-counter pain medicine, such as acetaminophen (Tylenol), ibuprofen (Advil, Motrin), or naproxen (Aleve). Read and follow all instructions on the label. · Be careful when taking over-the-counter cold or flu medicines and Tylenol at the same time. Many of these medicines have acetaminophen, which is Tylenol. Read the labels to make sure that you are not taking more than the recommended dose. Too much acetaminophen (Tylenol) can be harmful. · Drink plenty of fluids. Fluids may help soothe an irritated throat. Hot fluids, such as tea or soup, may help decrease throat pain. · Use over-the-counter throat lozenges to soothe pain. Regular cough drops or hard candy may also help. These should not be given to young children because of the risk of choking. · Do not smoke or allow others to smoke around you. If you need help quitting, talk to your doctor about stop-smoking programs and medicines. These can increase your chances of quitting for good. · Use a vaporizer or humidifier to add moisture to your bedroom. Follow the directions for cleaning the machine. When should you call for help? Call your doctor now or seek immediate medical care if: 
  · You have new or worse trouble swallowing.  
  · Your sore throat gets much worse on one side.  
 Watch closely for changes in your health, and be sure to contact your doctor if you do not get better as expected. Where can you learn more? Go to http://sim-mahesh.info/. Enter [phone] [id number] [phone] in the search box to learn more about \"Sore Throat: Care Instructions. \" Current as of: May 12, 2017 Content Version: 11.7 © 7892-7911 [employer]. Care instructions adapted under license by [employer] (which disclaims liability or warranty for this information).  If you have questions about a medical condition or this instruction, always ask your healthcare professional. [employer] disclaims any warranty or liability for your use of this information. Introducing [facility]! Rozina العلي introduces [employer] patient portal. Now you can access parts of your medical record, email your doctor's office, and request medication refills online. 1. In your internet browser, go to [email or web address] 2. Click on the First Time User? Click Here link in the Sign In box. You will see the New Member Sign Up page. 3. Enter your [employer] Access Code exactly as it appears below. You will not need to use this code after youve completed the sign-up process. If you do not sign up before the expiration date, you must request a new code. · [employer] Access Code: [email or web address] Expires: 12/25/2018 12:30 PM 
 
4. Enter the last four digits of your Social Security Number (xxxx) and Date of Birth (mm/dd/yyyy) as indicated and click Submit. You will be taken to the next sign-up page. 5. Create a [employer] ID. This will be your [employer] login ID and cannot be changed, so think of one that is secure and easy to remember. 6. Create a [employer] password. You can change your password at any time. 7. Enter your Password Reset Question and Answer. This can be used at a later time if you forget your password. 8. Enter your e-mail address. You will receive e-mail notification when new information is available in [address] 9. Click Sign Up. You can now view and download portions of your medical record. 10. Click the Download Summary menu link to download a portable copy of your medical information. If you have questions, please visit the Frequently Asked Questions section of the [employer] website. Remember, [employer] is NOT to be used for urgent needs. For medical emergencies, dial 911. Now available from your iPhone and Android! Please provide this summary of care documentation to your next provider. Your primary care clinician is listed as Mateo Ribeiro. If you have any questions after today's visit, please call [phone].

## 2018-09-26 NOTE — PROGRESS NOTES
Patient is a 51 y.o. female presenting with cold symptoms. Cold Symptoms   The history is provided by the patient. This is a new problem. The current episode started yesterday. There has been no fever. Associated symptoms include ear congestion, ear pain (bilateral), rhinorrhea and sore throat. Pertinent negatives include no chest pain, no chills, no sweats, no headaches, no myalgias, no shortness of breath, no wheezing, no nausea and no vomiting. Treatments tried: allegra and zyrtec. She is not a smoker. Her past medical history does not include asthma.         Past Medical History:   Diagnosis Date    Arthritis     L knee DJD    GERD (gastroesophageal reflux disease)     Headache     Hx of breast implants, bilateral     Nausea & vomiting     sometimes    Single cyst of left breast     Thyroid disease         Past Surgical History:   Procedure Laterality Date    HC PACK LASER CONE  1992    HX BREAST AUGMENTATION  1998    Saline implants    HX BREAST BIOPSY  x3 -L    2 cysts removed and 1 \"tumor\" removed in the past    HX CYST REMOVAL      HX GYN      removal of 1 ovary and cyst    HX KNEE ARTHROSCOPY  2002    left    HX OTHER SURGICAL      laser surgery for dysplasia    HX OTHER SURGICAL  2010    rectal     HX OTHER SURGICAL      cyst removal from ovaries         Family History   Problem Relation Age of Onset    Breast Cancer Maternal Aunt      diagnosed in 63's    Cancer Maternal Aunt      breast    Breast Cancer Other      diagnosed in 42's    Hypertension Father     Other Father      iritis, colitis    Thyroid Disease Mother     No Known Problems Sister     No Known Problems Sister     No Known Problems Son     No Known Problems Son     Diabetes Paternal Uncle     Diabetes Maternal Grandmother     Cancer Paternal Grandmother      colon    Diabetes Paternal Uncle     Diabetes Paternal Uncle         Social History     Social History    Marital status:      Spouse name: N/A  Number of children: N/A    Years of education: N/A     Occupational History    Not on file. Social History Main Topics    Smoking status: Never Smoker    Smokeless tobacco: Never Used    Alcohol use No      Comment: rare    Drug use: No    Sexual activity: Yes     Partners: Male     Other Topics Concern    Not on file     Social History Narrative                ALLERGIES: Sulfa (sulfonamide antibiotics)    Review of Systems   Constitutional: Negative for activity change, appetite change, chills and fever. HENT: Positive for congestion, ear pain (bilateral), rhinorrhea and sore throat. Negative for sinus pain, sinus pressure and trouble swallowing. Respiratory: Negative for cough, shortness of breath and wheezing. Cardiovascular: Negative for chest pain and palpitations. Gastrointestinal: Negative for nausea and vomiting. Musculoskeletal: Negative for myalgias. Neurological: Negative for dizziness and headaches. Hematological: Negative for adenopathy. Vitals:    09/26/18 1246   BP: 126/83   Pulse: 78   Resp: 16   Temp: 98.6 °F (37 °C)   SpO2: 99%   Weight: 126 lb (57.2 kg)   Height: 5' 3\" (1.6 m)       Physical Exam   Constitutional: She appears well-developed and well-nourished. No distress. HENT:   Right Ear: Tympanic membrane, external ear and ear canal normal.   Left Ear: Tympanic membrane, external ear and ear canal normal.   Nose: Nose normal. Right sinus exhibits no maxillary sinus tenderness and no frontal sinus tenderness. Left sinus exhibits no maxillary sinus tenderness and no frontal sinus tenderness. Mouth/Throat: Oropharynx is clear and moist and mucous membranes are normal. No oropharyngeal exudate, posterior oropharyngeal edema, posterior oropharyngeal erythema or tonsillar abscesses. Cardiovascular: Normal rate, regular rhythm and normal heart sounds. Pulmonary/Chest: Effort normal and breath sounds normal. No respiratory distress. She has no wheezes. She has no rales. Lymphadenopathy:     She has no cervical adenopathy. Neurological: She is alert. Skin: She is not diaphoretic. Psychiatric: She has a normal mood and affect. Her behavior is normal. Judgment and thought content normal.   Nursing note and vitals reviewed. [facility]    ICD-10-CM ICD-9-CM    1. Otalgia of both ears H92.03 388.70    2. Sore throat J02.9 462 AMB POC RAPID STREP A      UPPER RESPIRATORY CULTURE     Medications Ordered Today   Medications    fluticasone (FLONASE) 50 mcg/actuation nasal spray     Si Sprays by Both Nostrils route daily for 14 days. Dispense:  1 Bottle     Refill:  0     The patients condition was discussed with the patient and they understand. The patient is to follow up with PCP [facility]. If signs and symptoms become worse the pt is to go to the ER. The patient is to take medications as prescribed.      Results for orders placed or performed in visit on 18   AMB POC RAPID STREP A   Result Value Ref Range    VALID INTERNAL CONTROL POC Yes     Group A Strep Ag Negative Negative             Procedures

## 2018-09-28 LAB — BACTERIA SPEC RESP CULT: NORMAL

## 2018-12-31 ENCOUNTER — OFFICE VISIT (OUTPATIENT)
Dept: URGENT CARE | Age: 48
End: 2018-12-31

## 2018-12-31 VITALS
HEART RATE: 76 BPM | BODY MASS INDEX: 22.88 KG/M2 | HEIGHT: 64 IN | SYSTOLIC BLOOD PRESSURE: 137 MMHG | WEIGHT: 134 LBS | DIASTOLIC BLOOD PRESSURE: 84 MMHG | TEMPERATURE: 97.8 F | OXYGEN SATURATION: 99 % | RESPIRATION RATE: 16 BRPM

## 2018-12-31 DIAGNOSIS — H66.92 ACUTE LEFT OTITIS MEDIA: Primary | ICD-10-CM

## 2018-12-31 RX ORDER — AMOXICILLIN 875 MG/1
875 TABLET, FILM COATED ORAL EVERY 12 HOURS
Qty: 20 TAB | Refills: 0 | Status: SHIPPED | OUTPATIENT
Start: 2018-12-31 | End: 2019-01-10

## 2018-12-31 NOTE — PATIENT INSTRUCTIONS

## 2018-12-31 NOTE — PROGRESS NOTES
Cold Symptoms   The history is provided by the patient. This is a new problem. Episode onset: 3 days ago. The problem occurs constantly. The problem has been gradually worsening. There has been no fever. Associated symptoms include ear congestion (left), ear pain (left), rhinorrhea and myalgias. Pertinent negatives include no chest pain, no chills, no sweats, no headaches, no sore throat, no shortness of breath, no wheezing, no nausea and no vomiting. She has tried decongestants for the symptoms. The treatment provided mild relief. She is not a smoker.         Past Medical History:   Diagnosis Date    Arthritis     L knee DJD    GERD (gastroesophageal reflux disease)     Headache     Hx of breast implants, bilateral     Nausea & vomiting     sometimes    Single cyst of left breast     Thyroid disease         Past Surgical History:   Procedure Laterality Date    HC PACK LASER CONE  1992    HX BREAST AUGMENTATION  1998    Saline implants    HX BREAST BIOPSY  x3 -L    2 cysts removed and 1 \"tumor\" removed in the past    HX CYST REMOVAL      HX GYN      removal of 1 ovary and cyst    HX KNEE ARTHROSCOPY  2002    left    HX OTHER SURGICAL      laser surgery for dysplasia    HX OTHER SURGICAL  2010    rectal     HX OTHER SURGICAL      cyst removal from ovaries         Family History   Problem Relation Age of Onset    Breast Cancer Maternal Aunt         diagnosed in 63's    Cancer Maternal Aunt         breast    Breast Cancer Other         diagnosed in 42's    Hypertension Father     Other Father         iritis, colitis    Thyroid Disease Mother     No Known Problems Sister     No Known Problems Sister     No Known Problems Son     No Known Problems Son     Diabetes Paternal Uncle     Diabetes Maternal Grandmother     Cancer Paternal Grandmother         colon    Diabetes Paternal Uncle     Diabetes Paternal Uncle         Social History     Socioeconomic History    Marital status:  Spouse name: Not on file    Number of children: Not on file    Years of education: Not on file    Highest education level: Not on file   Social Needs    Financial resource strain: Not on file    Food insecurity - worry: Not on file    Food insecurity - inability: Not on file    Transportation needs - medical: Not on file   Vimty needs - non-medical: Not on file   Occupational History    Not on file   Tobacco Use    Smoking status: Never Smoker    Smokeless tobacco: Never Used   Substance and Sexual Activity    Alcohol use: No     Comment: rare    Drug use: No    Sexual activity: Yes     Partners: Male   Other Topics Concern    Not on file   Social History Narrative    Not on file                ALLERGIES: Sulfa (sulfonamide antibiotics)    Review of Systems   Constitutional: Negative for activity change, appetite change, chills and fever. HENT: Positive for congestion, ear pain (left) and rhinorrhea. Negative for sinus pressure, sinus pain, sore throat and trouble swallowing. Respiratory: Negative for cough, shortness of breath and wheezing. Cardiovascular: Negative for chest pain and palpitations. Gastrointestinal: Negative for nausea and vomiting. Musculoskeletal: Positive for myalgias. Neurological: Negative for dizziness and headaches. Hematological: Negative for adenopathy. Vitals:    12/31/18 1220   BP: 137/84   Pulse: 76   Resp: 16   Temp: 97.8 °F (36.6 °C)   SpO2: 99%   Weight: 134 lb (60.8 kg)   Height: 5' 4\" (1.626 m)       Physical Exam   Constitutional: She appears well-developed and well-nourished. No distress. HENT:   Right Ear: Tympanic membrane, external ear and ear canal normal.   Left Ear: External ear and ear canal normal. Tympanic membrane is erythematous and bulging. A middle ear effusion is present. Nose: Rhinorrhea present. Right sinus exhibits no maxillary sinus tenderness and no frontal sinus tenderness.  Left sinus exhibits no maxillary sinus tenderness and no frontal sinus tenderness. Mouth/Throat: Oropharynx is clear and moist and mucous membranes are normal. No oropharyngeal exudate, posterior oropharyngeal edema, posterior oropharyngeal erythema or tonsillar abscesses. Cardiovascular: Normal rate, regular rhythm and normal heart sounds. Pulmonary/Chest: Effort normal and breath sounds normal. No respiratory distress. She has no wheezes. She has no rales. Lymphadenopathy:     She has no cervical adenopathy. Neurological: She is alert. Skin: She is not diaphoretic. Psychiatric: She has a normal mood and affect. Her behavior is normal. Judgment and thought content normal.   Nursing note and vitals reviewed. [facility]    ICD-10-CM ICD-9-CM    1. Acute left otitis media H66.92 382.9      Medications Ordered Today   Medications    amoxicillin (AMOXIL) 875 mg tablet     Sig: Take 1 Tab by mouth every twelve (12) hours for 10 days. Dispense:  20 Tab     Refill:  0     The patients condition was discussed with the patient and they understand. The patient is to follow up with PCP [facility]. If signs and symptoms become worse the pt is to go to the ER. The patient is to take medications as prescribed.              Procedures

## 2019-02-05 ENCOUNTER — TELEPHONE (OUTPATIENT)
Dept: INTERNAL MEDICINE CLINIC | Age: 49
End: 2019-02-05

## 2019-02-05 ENCOUNTER — OFFICE VISIT (OUTPATIENT)
Dept: INTERNAL MEDICINE CLINIC | Age: 49
End: 2019-02-05

## 2019-02-05 VITALS
OXYGEN SATURATION: 98 % | HEIGHT: 64 IN | HEART RATE: 62 BPM | TEMPERATURE: 98 F | SYSTOLIC BLOOD PRESSURE: 131 MMHG | DIASTOLIC BLOOD PRESSURE: 77 MMHG | WEIGHT: 130 LBS | RESPIRATION RATE: 16 BRPM | BODY MASS INDEX: 22.2 KG/M2

## 2019-02-05 DIAGNOSIS — R94.6 THYROID FUNCTION TEST ABNORMAL: Primary | ICD-10-CM

## 2019-02-05 DIAGNOSIS — Z13.220 LIPID SCREENING: ICD-10-CM

## 2019-02-05 DIAGNOSIS — R21 RASH: ICD-10-CM

## 2019-02-05 DIAGNOSIS — Z83.3 FH: DIABETES MELLITUS: ICD-10-CM

## 2019-02-05 NOTE — TELEPHONE ENCOUNTER
#[phone] pt states Dr Christen Madsen said she got a CPE today and didn't need to see pt for a year. Pt wants coding changed to  CPE so she can get her co pay refunded.

## 2019-02-05 NOTE — PROGRESS NOTES
HISTORY OF PRESENT ILLNESS Bina Floyd is a 47 y.o. female. HPI Last here 8/20/18. Pt is here for routine care. 
  
BP today is 131/77 
  
Wt today is 130 lbs --up 4 lbs x 9/18 Her weight is within normal ranges 
  
Will get labs today 
  
Pt takes zyrtec at night Pt follows with Dr Valdemar Stone (derm) Last visit was 9/18 
  
Recall Pt saw Dr. Emily Tanner (rheum) in the past 
Pt completed labs and was told that she had no lupus However, pt had positive lab tests for lupus Her double stranded DNA was positive for the lupus Lov, pt c/o weakness and pain in wrist 
Pt saw Dr Oswald Melo (ortho) for this Reviewed notes 11/9/18: Discussed nature of her condition as well as treatment options. Would like to begin most conservative. Have given her a prescription for Voltaren gel. She will continue to use the splints that she has. Follow-up as needed. Pt is wearing a hand splint today, which she states makes it feel a lot better  
  
Pt c/o a spot on her nose that feels like dry skin She states that it has been there for a few months Discussed that there is not much visible Discussed could be an AK Advised discussing this with her derm Reviewed COLO report 1/19: repeat in 5 years, hemorrhoids Pt states that her father has polyps removed on a regular basis Recall fmhx breast cancer  
   
PREVENTIVE: 
Colonoscopy: 1/25/19, Dr Dung Ruby, repeat 5 years Pap: Dr. Edel Joiner, 9/18 Mammogram: [facility], 9/18, will get report DEXA: not yet needed Tdap: 12/19/2012 Pneumovax: not yet needed [id number]: not yet needed Shingrix: not yet needed Flu shot: declines Eye exam: Dr. Anton Quinteros, Summer 2018 Lipids: 8/18  Patient Active Problem List  
 Diagnosis Date Noted  Fibrocystic breast disease 07/29/2016  
 FH: diabetes mellitus 05/12/2015  Benign breast lumps 12/19/2012  Back skin lesion 12/19/2012  H/O bilateral breast implants 12/19/2012 Current Outpatient Medications Medication Sig Dispense Refill  cholecalciferol, vitamin D3, (VITAMIN D3 PO) Take  by mouth.  BLUE-GREEN ALGAE (SPIRULINA) Take 100 mg by mouth daily.  FLAXSEED OIL PO Take 600 mg by mouth daily. Past Surgical History:  
Procedure Laterality Date [address] [place] Saline implants  HX BREAST BIOPSY  x3 -L  
 2 cysts removed and 1 \"tumor\" removed in the past  
 HX CYST REMOVAL    
 HX GYN    
 removal of 1 ovary and cyst  
 HX KNEE ARTHROSCOPY  2002  
 left  HX OTHER SURGICAL    
 laser surgery for dysplasia  HX OTHER SURGICAL  2010  
 rectal   
 HX OTHER SURGICAL    
 cyst removal from ovaries Lab Results Component Value Date/Time WBC 4.5 08/20/2018 01:58 PM  
 HGB 12.4 08/20/2018 01:58 PM  
 Hemoglobin (POC) 14.5 06/14/2016 09:26 AM  
 HCT 37.3 08/20/2018 01:58 PM  
 PLATELET 675 55/60/1122 01:58 PM  
 MCV 86 08/20/2018 01:58 PM  
 
Lab Results Component Value Date/Time Cholesterol, total 199 08/20/2018 01:58 PM  
 HDL Cholesterol 84 08/20/2018 01:58 PM  
 LDL, calculated 100 (H) 08/20/2018 01:58 PM  
 Triglyceride 74 08/20/2018 01:58 PM  
 
Lab Results Component Value Date/Time GFR est non- 08/20/2018 01:58 PM  
 GFR est  08/20/2018 01:58 PM  
 Creatinine 0.71 08/20/2018 01:58 PM  
 BUN 9 08/20/2018 01:58 PM  
 Sodium 140 08/20/2018 01:58 PM  
 Potassium 3.9 08/20/2018 01:58 PM  
 Chloride 105 08/20/2018 01:58 PM  
 CO2 18 (L) 08/20/2018 01:58 PM  
  
Review of Systems Respiratory: Negative for shortness of breath. Cardiovascular: Negative for chest pain. Physical Exam  
Constitutional: She is oriented to person, place, and time. She appears well-developed and well-nourished. No distress. HENT:  
Head: Normocephalic and atraumatic. Mouth/Throat: Oropharynx is clear and moist. No oropharyngeal exudate. Eyes: Conjunctivae and EOM are normal. Right eye exhibits no discharge. Left eye exhibits no discharge. Neck: Normal range of motion. Neck supple. Cardiovascular: Normal rate, regular rhythm and normal heart sounds. Exam reveals no gallop and no friction rub. No murmur heard. Pulmonary/Chest: Effort normal and breath sounds normal. No respiratory distress. She has no wheezes. She has no rales. She exhibits no tenderness. Musculoskeletal: Normal range of motion. She exhibits no edema, tenderness or deformity. Lymphadenopathy:  
  She has no cervical adenopathy. Neurological: She is alert and oriented to person, place, and time. Coordination normal.  
Skin: Skin is warm and dry. No rash noted. She is not diaphoretic. No erythema. No pallor. 1mm scaley skin near right upper nose Psychiatric: She has a normal mood and affect. Her behavior is normal.  
 
 
ASSESSMENT and PLAN Physical exam--the patient has normal weight and blood pressure, labs today, declines flu shot, pelvic, mammo, colo utd. Eye exam utd ICD-10-CM ICD-9-CM 1. Thyroid function test abnormal 
 
Repeat TFTs, has positive TPO antibody, no tx needed unless TFTs become abnormal 
 R94.6 794.5 TSH 3RD GENERATION  
   T4, FREE THYROID PEROXIDASE (TPO) AB  
   LIPID PANEL  
   METABOLIC PANEL, COMPREHENSIVE  
   CBC W/O DIFF 2. Lipid screening Check lipids today, diet controlled Z13.220 V77.91 LIPID PANEL  
   METABOLIC PANEL, COMPREHENSIVE  
   CBC W/O DIFF 3. Rash - likely AK, she has a derm that she sees routinely and will have this frozen off at her next office visit 4. Fmhx of diabetes - will check fasting glucose today Depression screen reviewed and negative. Scribed by Jayda Weaver of [address] [place] [address] [address], as dictated by Dr. Júnior Haider. Current diagnosis and concerns discussed with pt at length. Pt understands risks and benefits or current treatment plan and medications, and accepts the treatment and medication with any possible risks.  Pt asks appropriate questions, which were answered. Pt was instructed to call with any concerns or problems. I have reviewed the note documented by the scribe. The services provided are my own. The documentation is accurate

## 2019-02-06 LAB
ALBUMIN SERPL-MCNC: 4.6 G/DL (ref 3.5–5.5)
ALBUMIN/GLOB SERPL: 1.6 {RATIO} (ref 1.2–2.2)
ALP SERPL-CCNC: 67 IU/L (ref 39–117)
ALT SERPL-CCNC: 16 IU/L (ref 0–32)
AST SERPL-CCNC: 20 IU/L (ref 0–40)
BILIRUB SERPL-MCNC: 0.5 MG/DL (ref 0–1.2)
BUN SERPL-MCNC: 10 MG/DL (ref 6–24)
BUN/CREAT SERPL: 14 (ref 9–23)
CALCIUM SERPL-MCNC: 9.2 MG/DL (ref 8.7–10.2)
CHLORIDE SERPL-SCNC: 106 MMOL/L (ref 96–106)
CHOLEST SERPL-MCNC: 169 MG/DL (ref 100–199)
CO2 SERPL-SCNC: 22 MMOL/L (ref 20–29)
CREAT SERPL-MCNC: 0.73 MG/DL (ref 0.57–1)
ERYTHROCYTE [DISTWIDTH] IN BLOOD BY AUTOMATED COUNT: 13.2 % (ref 12.3–15.4)
GLOBULIN SER CALC-MCNC: 2.9 G/DL (ref 1.5–4.5)
GLUCOSE SERPL-MCNC: 78 MG/DL (ref 65–99)
HCT VFR BLD AUTO: 41.7 % (ref 34–46.6)
HDLC SERPL-MCNC: 70 MG/DL
HGB BLD-MCNC: 13.6 G/DL (ref 11.1–15.9)
LDLC SERPL CALC-MCNC: 86 MG/DL (ref 0–99)
MCH RBC QN AUTO: 29.4 PG (ref 26.6–33)
MCHC RBC AUTO-ENTMCNC: 32.6 G/DL (ref 31.5–35.7)
MCV RBC AUTO: 90 FL (ref 79–97)
PLATELET # BLD AUTO: 250 X10E3/UL (ref 150–379)
POTASSIUM SERPL-SCNC: 4.2 MMOL/L (ref 3.5–5.2)
PROT SERPL-MCNC: 7.5 G/DL (ref 6–8.5)
RBC # BLD AUTO: 4.63 X10E6/UL (ref 3.77–5.28)
SODIUM SERPL-SCNC: 144 MMOL/L (ref 134–144)
T4 FREE SERPL-MCNC: 1.23 NG/DL (ref 0.82–1.77)
THYROPEROXIDASE AB SERPL-ACNC: 89 IU/ML (ref 0–34)
TRIGL SERPL-MCNC: 67 MG/DL (ref 0–149)
TSH SERPL DL<=0.005 MIU/L-ACNC: 1.67 UIU/ML (ref 0.45–4.5)
VLDLC SERPL CALC-MCNC: 13 MG/DL (ref 5–40)
WBC # BLD AUTO: 4.2 X10E3/UL (ref 3.4–10.8)

## 2019-02-06 NOTE — TELEPHONE ENCOUNTER
Confirmed with Ahsan Jha, recent visit can not be changed to cpe due to she had one in 8/18 and next cpe is due in 8/18. Called, spoke with pt. Two pt identifiers confirmed. Pt informed insurance will not cover another cpe until 08/19. Pt verbalized understanding of information discussed w/ no further questions at this time.

## 2019-02-06 NOTE — TELEPHONE ENCOUNTER
Called, spoke with Pt. Two pt identifiers confirmed. Pt informed her visit in 08/18 was coded as a cpe and the recent visit as a routine. Pt informed Dr. Monica Mcelroy did not use cpe because insurance covers cpe once a year and cpe is not due until 8/19. Pt states it's a new year so it should be covered under cpe. Pt states she renews her insurance every January. Pt informed will speak  to take a look and see if coding can be changed.

## 2019-02-12 ENCOUNTER — OFFICE VISIT (OUTPATIENT)
Dept: PRIMARY CARE CLINIC | Age: 49
End: 2019-02-12

## 2019-02-12 VITALS
HEART RATE: 76 BPM | OXYGEN SATURATION: 98 % | SYSTOLIC BLOOD PRESSURE: 139 MMHG | WEIGHT: 130 LBS | DIASTOLIC BLOOD PRESSURE: 90 MMHG | TEMPERATURE: 98.4 F | BODY MASS INDEX: 22.2 KG/M2 | HEIGHT: 64 IN

## 2019-02-12 DIAGNOSIS — J06.9 UPPER RESPIRATORY TRACT INFECTION, UNSPECIFIED TYPE: ICD-10-CM

## 2019-02-12 DIAGNOSIS — R05.9 COUGH: Primary | ICD-10-CM

## 2019-02-12 LAB
FLUAV+FLUBV AG NOSE QL IA.RAPID: NEGATIVE POS/NEG
FLUAV+FLUBV AG NOSE QL IA.RAPID: NEGATIVE POS/NEG
VALID INTERNAL CONTROL?: YES

## 2019-02-12 NOTE — PROGRESS NOTES
Subjective:   Fred Tamayo is a 47 y.o. female who complains of no s/s of illness for 0 days, completely resolved since that time. She denies a history of anorexia, chest pain, chills, dizziness, fatigue, fevers, myalgias, nausea, shortness of breath, sweats, vomiting, weakness, weight loss,   wheezing, cough and sputum production. Evaluation to date: none. Treatment to date: OTC products. Patient does not smoke cigarettes. Relevant PMH: No pertinent additional PMH. Patient Active Problem List   Diagnosis Code    Benign breast lumps N63.0    Back skin lesion L98.9    H/O bilateral breast implants Z98.82    FH: diabetes mellitus Z83.3    Fibrocystic breast disease N60.19     Patient Active Problem List    Diagnosis Date Noted    Fibrocystic breast disease 07/29/2016    FH: diabetes mellitus 05/12/2015    Benign breast lumps 12/19/2012    Back skin lesion 12/19/2012    H/O bilateral breast implants 12/19/2012     Current Outpatient Medications   Medication Sig Dispense Refill    fexofenadine HCl (ALLEGRA PO) Take  by mouth.  Cetirizine (ZYRTEC) 10 mg cap Take  by mouth.  cholecalciferol, vitamin D3, (VITAMIN D3 PO) Take  by mouth.  FLAXSEED OIL PO Take 600 mg by mouth daily.        Allergies   Allergen Reactions    Sulfa (Sulfonamide Antibiotics) Rash     Past Medical History:   Diagnosis Date    Arthritis     L knee DJD    GERD (gastroesophageal reflux disease)     Headache     Hx of breast implants, bilateral     Nausea & vomiting     sometimes    Single cyst of left breast     Thyroid disease      Past Surgical History:   Procedure Laterality Date    HC PACK LASER CONE  1992    HX BREAST AUGMENTATION  1998    Saline implants    HX BREAST BIOPSY  x3 -L    2 cysts removed and 1 \"tumor\" removed in the past    HX CYST REMOVAL      HX GYN      removal of 1 ovary and cyst    HX KNEE ARTHROSCOPY  2002    left    HX OTHER SURGICAL      laser surgery for dysplasia    HX OTHER SURGICAL  2010    rectal     HX OTHER SURGICAL      cyst removal from ovaries     Family History   Problem Relation Age of Onset    Breast Cancer Maternal Aunt         diagnosed in 63's    Cancer Maternal Aunt         breast    Breast Cancer Other         diagnosed in 42's    Hypertension Father     Other Father         iritis, colitis    Thyroid Disease Mother     No Known Problems Sister     No Known Problems Sister     No Known Problems Son     No Known Problems Son     Diabetes Paternal Uncle     Diabetes Maternal Grandmother     Cancer Paternal Grandmother         colon    Diabetes Paternal Uncle     Diabetes Paternal Uncle      Social History     Tobacco Use    Smoking status: Never Smoker    Smokeless tobacco: Never Used   Substance Use Topics    Alcohol use: No     Comment: rare        Review of Systems  Pertinent items are noted in HPI. Objective:     Visit Vitals  /90   Pulse 76   Temp 98.4 °F (36.9 °C) (Oral)   Ht 5' 4\" (1.626 m)   Wt 130 lb (59 kg)   SpO2 98%   BMI 22.31 kg/m²     General:  alert, cooperative, no distress   Eyes: conjunctivae/corneas clear. PERRL, EOM's intact. Fundi benign   Ears: normal TM's and external ear canals AU   Sinuses: Normal paranasal sinuses without tenderness   Mouth:  Lips, mucosa, and tongue normal. Teeth and gums normal   Neck: supple, symmetrical, trachea midline and no adenopathy. Heart: S1 and S2 normal, no murmurs noted. Lungs: clear to auscultation bilaterally   Abdomen: soft, non-tender. Bowel sounds normal. No masses,  no organomegaly        Assessment/Plan:   viral upper respiratory illness  Suggested symptomatic OTC remedies. RTC prn. Discussed diagnosis and treatment of viral URIs. Discussed the importance of avoiding unnecessary antibiotic therapy. ICD-10-CM ICD-9-CM    1. Cough R05 786.2 AMB POC SENIA INFLUENZA A/B TEST   2. Upper respiratory tract infection, unspecified type J06.9 465.9    .   Visit Vitals  BP 139/90   Pulse 76   Temp 98.4 °F (36.9 °C) (Oral)   Ht 5' 4\" (1.626 m)   Wt 130 lb (59 kg)   SpO2 98%   BMI 22.31 kg/m²     Spoke with the patient regarding their blood pressure (BP) reading at today's visit. The patient verbalized understanding of need to maintain BP lower than 140/90. The patient will follow up with their primary care physician regarding management and/or medications that may be needed. Drepssion Screening has been completed. The patient isdenies having a history of depression. The patient is nottaking medication and is being followed by their PCP at this time. This patient does  have a primary care physician. Referral was not given a referral at todays visit. Immunizations: The patient is current on their influenza immunization at this time. The patient does not   want to receive the influenza immunization today. Follow up instructions given at today's visit were verbalized by the patient/parent. The signs of infection are fever > 100.4, increase fatigue, change in mental status, or decrease in urinary output. The patient/parent verbalized understanding of taking medications prescribed during this visit as prescribed.

## 2019-02-12 NOTE — PATIENT INSTRUCTIONS
Cough: Care Instructions  Your Care Instructions    A cough is your body's response to something that bothers your throat or airways. Many things can cause a cough. You might cough because of a cold or the flu, bronchitis, or asthma. Smoking, postnasal drip, allergies, and stomach acid that backs up into your throat also can cause coughs. A cough is a symptom, not a disease. Most coughs stop when the cause, such as a cold, goes away. You can take a few steps at home to cough less and feel better. Follow-up care is a key part of your treatment and safety. Be sure to make and go to all appointments, and call your doctor if you are having problems. It's also a good idea to know your test results and keep a list of the medicines you take. How can you care for yourself at home? · Drink lots of water and other fluids. This helps thin the mucus and soothes a dry or sore throat. Honey or lemon juice in hot water or tea may ease a dry cough. · Take cough medicine as directed by your doctor. · Prop up your head on pillows to help you breathe and ease a dry cough. · Try cough drops to soothe a dry or sore throat. Cough drops don't stop a cough. Medicine-flavored cough drops are no better than candy-flavored drops or hard candy. · Do not smoke. Avoid secondhand smoke. If you need help quitting, talk to your doctor about stop-smoking programs and medicines. These can increase your chances of quitting for good. When should you call for help? Call 911 anytime you think you may need emergency care.  For example, call if:    · You have severe trouble breathing.    Call your doctor now or seek immediate medical care if:    · You cough up blood.     · You have new or worse trouble breathing.     · You have a new or higher fever.     · You have a new rash.    Watch closely for changes in your health, and be sure to contact your doctor if:    · You cough more deeply or more often, especially if you notice more mucus or a change in the color of your mucus.     · You have new symptoms, such as a sore throat, an earache, or sinus pain.     · You do not get better as expected. Where can you learn more? Go to http://sim-mahesh.info/. Enter D279 in the search box to learn more about \"Cough: Care Instructions. \"  Current as of: September 5, 2018  Content Version: 11.9  © 1086-8936 [employer]. Care instructions adapted under license by [employer] (which disclaims liability or warranty for this information). If you have questions about a medical condition or this instruction, always ask your healthcare professional. [address] any warranty or liability for your use of this information.

## 2019-03-12 ENCOUNTER — TELEPHONE (OUTPATIENT)
Dept: INTERNAL MEDICINE CLINIC | Age: 49
End: 2019-03-12

## 2019-03-18 ENCOUNTER — TELEPHONE (OUTPATIENT)
Dept: INTERNAL MEDICINE CLINIC | Age: 49
End: 2019-03-18

## 2019-03-18 NOTE — TELEPHONE ENCOUNTER
Called, spoke to pt. Two pt identifiers confirmed. Pt informed that there appears to be no record of the blood type. Pt informed that if she has had a surgery in the past, that the surgeon's office may have that info. Pt informed that without medical cause, insurance will not cover the test.  Pt verbalized understanding of information discussed w/ no further questions at this time.

## 2019-03-18 NOTE — TELEPHONE ENCOUNTER
Patient states she needs a call back to be advised what her blood type is or how she can find this out. Please call. Thank you

## 2019-07-11 ENCOUNTER — TELEPHONE (OUTPATIENT)
Dept: INTERNAL MEDICINE CLINIC | Age: 49
End: 2019-07-11

## 2019-07-11 DIAGNOSIS — E07.9 THYROID DYSFUNCTION: ICD-10-CM

## 2019-07-11 DIAGNOSIS — Z00.00 ANNUAL PHYSICAL EXAM: Primary | ICD-10-CM

## 2019-07-11 NOTE — TELEPHONE ENCOUNTER
----- Message from Moises Cortez sent at 7/11/2019 12:38 PM EDT -----  Regarding: Dr. Selene Maynard  envGeneral Message/Vendor Calls    Best contact number(s):[phone]  Patient wants to know when she is supposed to come in for physical and bloodwork        Message copied/pasted from [employer]

## 2019-07-11 NOTE — TELEPHONE ENCOUNTER
MD Madeleine Hutchinson, [facility]   Caller: Unspecified (Today,  3:09 PM)             Lipids, cmp, cbc, tsh, free Numidia Floyd Dr. Angélica Page, labs ordered.

## 2019-07-11 NOTE — TELEPHONE ENCOUNTER
Called, spoke to pt. Two pt identifiers confirmed. Pt offered and accepted appt for 2/7/19 0915. Pt asking to what labs to be done for that appt. Pt informed Dr. Elfego Patricio will be notified. Pt verbalized understanding of information discussed w/ no further questions at this time.

## 2019-07-12 ENCOUNTER — OFFICE VISIT (OUTPATIENT)
Dept: URGENT CARE | Age: 49
End: 2019-07-12

## 2019-07-12 VITALS
OXYGEN SATURATION: 98 % | TEMPERATURE: 98.5 F | WEIGHT: 121 LBS | SYSTOLIC BLOOD PRESSURE: 138 MMHG | HEIGHT: 64 IN | RESPIRATION RATE: 16 BRPM | BODY MASS INDEX: 20.66 KG/M2 | HEART RATE: 63 BPM | DIASTOLIC BLOOD PRESSURE: 89 MMHG

## 2019-07-12 DIAGNOSIS — H69.82 EUSTACHIAN TUBE DYSFUNCTION, LEFT: ICD-10-CM

## 2019-07-12 DIAGNOSIS — H92.02 EAR PAIN, LEFT: Primary | ICD-10-CM

## 2019-07-12 RX ORDER — FLUTICASONE PROPIONATE 50 MCG
2 SPRAY, SUSPENSION (ML) NASAL DAILY
Qty: 1 BOTTLE | Refills: 0 | Status: SHIPPED | OUTPATIENT
Start: 2019-07-12

## 2019-07-12 RX ORDER — PREDNISONE 10 MG/1
TABLET ORAL
Qty: 21 TAB | Refills: 0 | Status: SHIPPED | OUTPATIENT
Start: 2019-07-12 | End: 2019-09-30

## 2019-07-12 NOTE — PATIENT INSTRUCTIONS
Eustachian Tube Problems: Care Instructions  Your Care Instructions    The eustachian (say \"you-STAY-shee-un\") tubes run between the inside of the ears and the throat. They keep air pressure stable in the ears. If your eustachian tubes become blocked, the air pressure in your ears changes. The fluids from a cold can clog eustachian tubes, causing pain in the ears. A quick change in air pressure can cause eustachian tubes to close up. This might happen when an airplane changes altitude or when a  goes up or down underwater. Eustachian tube problems often clear up on their own or after antibiotic treatment. If your tubes continue to be blocked, you may need surgery. Follow-up care is a key part of your treatment and safety. Be sure to make and go to all appointments, and call your doctor if you are having problems. It's also a good idea to know your test results and keep a list of the medicines you take. How can you care for yourself at home? · To ease ear pain, apply a warm washcloth or a heating pad set on low. There may be some drainage from the ear when the heat melts earwax. Put a cloth between the heat source and your skin. Do not use a heating pad with children. · If your doctor prescribed antibiotics, take them as directed. Do not stop taking them just because you feel better. You need to take the full course of antibiotics. · Your doctor may recommend over-the-counter medicine. Be safe with medicines. Oral or nasal decongestants may relieve ear pain. Avoid decongestants that are combined with antihistamines, which tend to cause more blockage. But if allergies seem to be the problem, your doctor may recommend a combination. Be careful with cough and cold medicines. Don't give them to children younger than 6, because they don't work for children that age and can even be harmful. For children 6 and older, always follow all the instructions carefully.  Make sure you know how much medicine to give and how long to use it. And use the dosing device if one is included. When should you call for help? Call your doctor now or seek immediate medical care if:    · You develop sudden, complete hearing loss.     · You have severe pain or feel dizzy.     · You have new or increasing pus or blood draining from your ear.     · You have redness, swelling, or pain around or behind the ear.    Watch closely for changes in your health, and be sure to contact your doctor if:    · You do not get better after 2 weeks.     · You have any new symptoms, such as itching or a feeling of fullness in the ear. Where can you learn more? Go to http://sim-mahesh.info/. Enter Y822 in the search box to learn more about \"Eustachian Tube Problems: Care Instructions. \"  Current as of: March 27, 2018  Content Version: 11.9  © 5370-7407 [employer]. Care instructions adapted under license by [employer] (which disclaims liability or warranty for this information). If you have questions about a medical condition or this instruction, always ask your healthcare professional. [address] any warranty or liability for your use of this information.

## 2019-07-16 NOTE — PROGRESS NOTES
Ear Pain   This is a recurrent problem. The current episode started more than 1 week ago. The problem occurs constantly. The problem has not changed since onset. Pertinent negatives include no headaches. Associated symptoms comments: See ROS. Nothing aggravates the symptoms. Nothing relieves the symptoms. She has tried nothing for the symptoms.         Past Medical History:   Diagnosis Date    Arthritis     L knee DJD    GERD (gastroesophageal reflux disease)     Headache     Hx of breast implants, bilateral     Nausea & vomiting     sometimes    Single cyst of left breast     Thyroid disease         Past Surgical History:   Procedure Laterality Date    HC PACK LASER CONE  1992    HX BREAST AUGMENTATION  1998    Saline implants    HX BREAST BIOPSY  x3 -L    2 cysts removed and 1 \"tumor\" removed in the past    HX CYST REMOVAL      HX GYN      removal of 1 ovary and cyst    HX KNEE ARTHROSCOPY  2002    left    HX OTHER SURGICAL      laser surgery for dysplasia    HX OTHER SURGICAL  2010    rectal     HX OTHER SURGICAL      cyst removal from ovaries         Family History   Problem Relation Age of Onset    Breast Cancer Maternal Aunt         diagnosed in 63's    Cancer Maternal Aunt         breast    Breast Cancer Other         diagnosed in 42's    Hypertension Father     Other Father         iritis, colitis    Thyroid Disease Mother     No Known Problems Sister     No Known Problems Sister     No Known Problems Son     No Known Problems Son     Diabetes Paternal Uncle     Diabetes Maternal Grandmother     Cancer Paternal Grandmother         colon    Diabetes Paternal Uncle     Diabetes Paternal Uncle         Social History     Socioeconomic History    Marital status:      Spouse name: Not on file    Number of children: Not on file    Years of education: Not on file    Highest education level: Not on file   Occupational History    Not on file   Social Needs    Financial resource strain: Not on file    Food insecurity:     Worry: Not on file     Inability: Not on file    Transportation needs:     Medical: Not on file     Non-medical: Not on file   Tobacco Use    Smoking status: Never Smoker    Smokeless tobacco: Never Used   Substance and Sexual Activity    Alcohol use: No     Comment: rare    Drug use: No     Types: OTC    Sexual activity: Yes     Partners: Male   Lifestyle    Physical activity:     Days per week: Not on file     Minutes per session: Not on file    Stress: Not on file   Relationships    Social connections:     Talks on phone: Not on file     Gets together: Not on file     Attends Congregational service: Not on file     Active member of club or organization: Not on file     Attends meetings of clubs or organizations: Not on file     Relationship status: Not on file    Intimate partner violence:     Fear of current or ex partner: Not on file     Emotionally abused: Not on file     Physically abused: Not on file     Forced sexual activity: Not on file   Other Topics Concern    Not on file   Social History Narrative    Not on file                ALLERGIES: Sulfa (sulfonamide antibiotics)    Review of Systems   HENT: Positive for congestion, ear pain and postnasal drip. Negative for sinus pressure and sinus pain. Neurological: Negative for headaches. All other systems reviewed and are negative. Vitals:    07/12/19 1419   BP: 138/89   Pulse: 63   Resp: 16   Temp: 98.5 °F (36.9 °C)   SpO2: 98%   Weight: 121 lb (54.9 kg)   Height: 5' 4\" (1.626 m)       Physical Exam   Constitutional: No distress. HENT:   Right Ear: Tympanic membrane and ear canal normal.   Left Ear: Tympanic membrane and ear canal normal.   Nose: Nose normal.   Mouth/Throat: No oropharyngeal exudate, posterior oropharyngeal edema or posterior oropharyngeal erythema. Eyes: Conjunctivae are normal. Right eye exhibits no discharge. Left eye exhibits no discharge. Neck: Neck supple. Pulmonary/Chest: Effort normal and breath sounds normal. No respiratory distress. She has no wheezes. She has no rales. Lymphadenopathy:     She has no cervical adenopathy. Skin: No rash noted. Nursing note and vitals reviewed. MDM    Procedures      ICD-10-CM ICD-9-CM    1. Ear pain, left H92.02 388.70    2. Eustachian tube dysfunction, left H69.82 381.81      Medications Ordered Today   Medications    predniSONE (STERAPRED DS) 10 mg dose pack     Sig: As directed     Dispense:  21 Tab     Refill:  0    fluticasone propionate (FLONASE) 50 mcg/actuation nasal spray     Si Sprays by Both Nostrils route daily. Dispense:  1 Bottle     Refill:  0     No results found for any visits on 19. The patients condition was discussed with the patient and they understand. The patient is to follow up with primary care doctor. If signs and symptoms become worse the pt is to go to the ER. The patient is to take medications as prescribed.

## 2019-09-26 ENCOUNTER — TELEPHONE (OUTPATIENT)
Dept: INTERNAL MEDICINE CLINIC | Age: 49
End: 2019-09-26

## 2019-09-26 NOTE — TELEPHONE ENCOUNTER
Called, spoke to pt. Two pt identifiers confirmed. Pt c/o elevated BP readings. Pt states face, neck, and chest have redness this week. Pt reports 151/101 at Dr gillespie this week. This morning, 159/94.  147/96 just recently this morning. Pt states having some forgetfulness but no other sx. Pt states no pain. Reports could have more sodium and/or caffeine in diet. Pt was told she had foot/ankle swelling since yesterday. Has some ankle swelling today. Pt offered and accepted appt for 9/30/19 1430. Pt verbalized understanding of information discussed w/ no further questions at this time.

## 2019-09-26 NOTE — TELEPHONE ENCOUNTER
#[phone] pt states she has htn issues. She is red in face, neck and chest.     Today in AM b/p 159/94      This week at a doctor appt it was 151/101    Please call to advise.

## 2019-09-30 ENCOUNTER — OFFICE VISIT (OUTPATIENT)
Dept: INTERNAL MEDICINE CLINIC | Age: 49
End: 2019-09-30

## 2019-09-30 VITALS
HEIGHT: 64 IN | TEMPERATURE: 98 F | WEIGHT: 129 LBS | BODY MASS INDEX: 22.02 KG/M2 | HEART RATE: 68 BPM | OXYGEN SATURATION: 99 % | DIASTOLIC BLOOD PRESSURE: 76 MMHG | SYSTOLIC BLOOD PRESSURE: 115 MMHG | RESPIRATION RATE: 16 BRPM

## 2019-09-30 DIAGNOSIS — R60.0 LOCALIZED EDEMA: ICD-10-CM

## 2019-09-30 DIAGNOSIS — R03.0 ELEVATED BP WITHOUT DIAGNOSIS OF HYPERTENSION: Primary | ICD-10-CM

## 2019-09-30 DIAGNOSIS — R07.89 ATYPICAL CHEST PAIN: ICD-10-CM

## 2019-09-30 NOTE — PROGRESS NOTES
HISTORY OF PRESENT ILLNESS  Regan Duffy is a 47 y.o. female.   HPI   Last here 2/5/19 Pt is here for routine/acute care.     BP today is 122/80; her cuff read 115//76   Pt states her BP at ortho was high, 151/101   Pt reports increased swelling in ankles at derm appt which caused her to keep track of BP   Discussed leg elevation and compression hose    BP at home 159/94 147/96 134/82 144/94 163/103 133/85 131/88 136/90   Today  127/63 134/66   Will check BP cuff, pt brought this in today   Pt has fmhx of htn, her father has this and he is of nl wt   Pt states this a new problem for her father as well   Advised pt to check BP every week when she is calm and rested     Pt started having a tight feeling that radiated down her L side   Went to ER   BP was 171/95, ekg was nl, labs were nl   Was given 4 asa and tightness resolved   Pt exercises every day, denies any CP with exertion   Discussed this is likely not cardiac   Pt thinks this was related to being sick as pain moved across her back and felt like an ache   Then woke up with cough   States pain has now resolved as well as cough   Discussed sxs to look out for that would warrant cardiac workup      Wt today is 129 lbs-- stable x lov   Pt exercises everyday   Her weight is within normal ranges     Reviewed labs 2/19   Will get labs today      Pt takes zyrtec at night      Pt follows with Dr Anne Loomis (derm)  Last visit was 9/19     Recall Pt saw Dr. Candida Ruiz (rheum) in the past  Pt completed labs and was told that she had no lupus   However, pt had positive lab tests for lupus  Her double stranded DNA was positive for the lupus    previously pt c/o weakness and pain in wrist  Pt saw Dr Vish Marc (ortho) for trigger thumb   Last visit was 9/24/19   Has been getting injections for this      Continues on vit D 5000 U      Recall fmhx breast cancer       PREVENTIVE:  Colonoscopy: 1/25/19, Dr Hiral Chadwick, repeat 5 years  Pap: Dr. Alexa Marie, 9/19   Mammogram: [facility], 8/19   DEXA: not yet needed  Tdap: 12/19/2012  Pneumovax: not yet needed  [id number]: not yet needed  Shingrix: not yet needed  Flu shot: declines  Eye exam: Dr. Cr Durant, Summer 2019  Lipids: 2/19  LDL 86     Patient Active Problem List    Diagnosis Date Noted    Fibrocystic breast disease 07/29/2016    FH: diabetes mellitus 05/12/2015    Benign breast lumps 12/19/2012    Back skin lesion 12/19/2012    H/O bilateral breast implants 12/19/2012     Current Outpatient Medications   Medication Sig Dispense Refill    predniSONE (STERAPRED DS) 10 mg dose pack As directed 21 Tab 0    fluticasone propionate (FLONASE) 50 mcg/actuation nasal spray 2 Sprays by Both Nostrils route daily. 1 Bottle 0    fexofenadine HCl (ALLEGRA PO) Take  by mouth.  Cetirizine (ZYRTEC) 10 mg cap Take  by mouth.  cholecalciferol, vitamin D3, (VITAMIN D3 PO) Take  by mouth.  FLAXSEED OIL PO Take 600 mg by mouth daily.        Past Surgical History:   Procedure Laterality Date    HC PACK LASER CONE  1992    HX BREAST AUGMENTATION  1998    Saline implants    HX BREAST BIOPSY  x3 -L    2 cysts removed and 1 \"tumor\" removed in the past    HX CYST REMOVAL      HX GYN      removal of 1 ovary and cyst    HX KNEE ARTHROSCOPY  2002    left    HX OTHER SURGICAL      laser surgery for dysplasia    HX OTHER SURGICAL  2010    rectal     HX OTHER SURGICAL      cyst removal from ovaries      Lab Results   Component Value Date/Time    WBC 4.2 02/05/2019 09:14 AM    HGB 13.6 02/05/2019 09:14 AM    Hemoglobin (POC) 14.5 06/14/2016 09:26 AM    HCT 41.7 02/05/2019 09:14 AM    PLATELET 130 89/38/7835 09:14 AM    MCV 90 02/05/2019 09:14 AM     Lab Results   Component Value Date/Time    Cholesterol, total 169 02/05/2019 09:14 AM    HDL Cholesterol 70 02/05/2019 09:14 AM    LDL, calculated 86 02/05/2019 09:14 AM    Triglyceride 67 02/05/2019 09:14 AM     Lab Results   Component Value Date/Time    GFR est non-AA 96 02/05/2019 09:14 AM    GFR est  02/05/2019 09:14 AM    Creatinine 0.73 02/05/2019 09:14 AM    BUN 10 02/05/2019 09:14 AM    Sodium 144 02/05/2019 09:14 AM    Potassium 4.2 02/05/2019 09:14 AM    Chloride 106 02/05/2019 09:14 AM    CO2 22 02/05/2019 09:14 AM        Review of Systems   Respiratory: Negative for shortness of breath. Cardiovascular: Negative for chest pain. Physical Exam   Constitutional: She is oriented to person, place, and time. She appears well-developed and well-nourished. No distress. HENT:   Head: Normocephalic and atraumatic. Mouth/Throat: Oropharynx is clear and moist. No oropharyngeal exudate. Eyes: Conjunctivae and EOM are normal. Right eye exhibits no discharge. Left eye exhibits no discharge. Neck: Normal range of motion. Neck supple. Cardiovascular: Normal rate, regular rhythm and normal heart sounds. Exam reveals no gallop and no friction rub. No murmur heard. Pulmonary/Chest: Effort normal and breath sounds normal. No respiratory distress. She has no wheezes. She has no rales. She exhibits no tenderness. Musculoskeletal: Normal range of motion. She exhibits no edema, tenderness or deformity. Lymphadenopathy:     She has no cervical adenopathy. Neurological: She is alert and oriented to person, place, and time. Coordination normal.   Skin: Skin is warm and dry. No rash noted. She is not diaphoretic. No erythema. No pallor. Psychiatric: She has a normal mood and affect. Her behavior is normal.       ASSESSMENT and PLAN    ICD-10-CM ICD-9-CM    1. Elevated BP without diagnosis of hypertension                Pt had several elevated bp readings which seemed related to stress or illness bp good today home cuff reading was similar and also good no meds needed for now continue to monitor  S85.3 233.8 METABOLIC PANEL, COMPREHENSIVE      CBC W/O DIFF      T4, FREE      TSH 3RD GENERATION   2.  Localized edema                  Stable on lasix continue  R55.6 656.2 METABOLIC PANEL, COMPREHENSIVE CBC W/O DIFF      T4, FREE      TSH 3RD GENERATION   3. Atypical chest pain                  Pt had side pain which appeared to be musculoskeletal which has completely resolved had eval in [place] [facility] that was unremarkable to include ekg and troponins no further workup needed pt also exercises routinely and reports no exertional sxs  B54.10 751.41 METABOLIC PANEL, COMPREHENSIVE      CBC W/O DIFF      T4, FREE      TSH 3RD GENERATION            Depression screen reviewed and negative. Scribed by Symone Church of [address], as dictated by Dr. Tio Baltazar. Current diagnosis and concerns discussed with pt at length. Pt understands risks and benefits or current treatment plan and medications, and accepts the treatment and medication with any possible risks. Pt asks appropriate questions, which were answered. Pt was instructed to call with any concerns or problems. I have reviewed the note documented by the scribe. The services provided are my own.   The documentation is accurate

## 2019-10-01 LAB
ALBUMIN SERPL-MCNC: 4.6 G/DL (ref 3.5–5.5)
ALBUMIN/GLOB SERPL: 1.7 {RATIO} (ref 1.2–2.2)
ALP SERPL-CCNC: 76 IU/L (ref 39–117)
ALT SERPL-CCNC: 18 IU/L (ref 0–32)
AST SERPL-CCNC: 17 IU/L (ref 0–40)
BILIRUB SERPL-MCNC: 0.2 MG/DL (ref 0–1.2)
BUN SERPL-MCNC: 16 MG/DL (ref 6–24)
BUN/CREAT SERPL: 29 (ref 9–23)
CALCIUM SERPL-MCNC: 9.6 MG/DL (ref 8.7–10.2)
CHLORIDE SERPL-SCNC: 101 MMOL/L (ref 96–106)
CO2 SERPL-SCNC: 25 MMOL/L (ref 20–29)
CREAT SERPL-MCNC: 0.56 MG/DL (ref 0.57–1)
ERYTHROCYTE [DISTWIDTH] IN BLOOD BY AUTOMATED COUNT: 12.5 % (ref 12.3–15.4)
GLOBULIN SER CALC-MCNC: 2.7 G/DL (ref 1.5–4.5)
GLUCOSE SERPL-MCNC: 87 MG/DL (ref 65–99)
HCT VFR BLD AUTO: 39.2 % (ref 34–46.6)
HGB BLD-MCNC: 13.6 G/DL (ref 11.1–15.9)
MCH RBC QN AUTO: 30.1 PG (ref 26.6–33)
MCHC RBC AUTO-ENTMCNC: 34.7 G/DL (ref 31.5–35.7)
MCV RBC AUTO: 87 FL (ref 79–97)
PLATELET # BLD AUTO: 274 X10E3/UL (ref 150–450)
POTASSIUM SERPL-SCNC: 4.3 MMOL/L (ref 3.5–5.2)
PROT SERPL-MCNC: 7.3 G/DL (ref 6–8.5)
RBC # BLD AUTO: 4.52 X10E6/UL (ref 3.77–5.28)
SODIUM SERPL-SCNC: 140 MMOL/L (ref 134–144)
T4 FREE SERPL-MCNC: 1.45 NG/DL (ref 0.82–1.77)
TSH SERPL DL<=0.005 MIU/L-ACNC: 0.57 UIU/ML (ref 0.45–4.5)
WBC # BLD AUTO: 6.3 X10E3/UL (ref 3.4–10.8)

## 2020-02-06 ENCOUNTER — OFFICE VISIT (OUTPATIENT)
Dept: INTERNAL MEDICINE CLINIC | Age: 50
End: 2020-02-06

## 2020-02-06 VITALS
HEIGHT: 64 IN | HEART RATE: 74 BPM | RESPIRATION RATE: 16 BRPM | WEIGHT: 125 LBS | BODY MASS INDEX: 21.34 KG/M2 | OXYGEN SATURATION: 100 % | DIASTOLIC BLOOD PRESSURE: 86 MMHG | TEMPERATURE: 97.6 F | SYSTOLIC BLOOD PRESSURE: 137 MMHG

## 2020-02-06 DIAGNOSIS — H93.8X2 EAR PRESSURE, LEFT: ICD-10-CM

## 2020-02-06 DIAGNOSIS — I73.00 RAYNAUD'S PHENOMENON WITHOUT GANGRENE: ICD-10-CM

## 2020-02-06 DIAGNOSIS — Z00.00 PHYSICAL EXAM: Primary | ICD-10-CM

## 2020-02-06 NOTE — PROGRESS NOTES
HISTORY OF PRESENT ILLNESS  Connee Pallas is a 45 y.o. female.   HPI   Last here 9/30/19 Pt is here for routine/acute care.     BP today is 137/86   BP at home 127/70   Prev she was having high bp   She thinks this was due to energy drinks, she stopped drinking this and her sxs have resolved        Wt today is 125 lbs-- down 4 lbs x lov   Pt exercises everyday   Her weight is within normal ranges     Reviewed labs 9/19   Will get labs today      Pt takes zyrtec at night      Pt follows with Dr Rossy Rider (derm)  Last visit was 9/19     Recall Pt saw Dr. Master Choudhary (rheum) in the past  Pt completed labs and was told that she had no lupus   However, pt had positive lab tests for lupus  Her double stranded DNA was positive for the lupus    Pt saw Dr Jorge Thomas (ortho) for trigger thumb   Last visit was 9/24/19   Got injections for this which helped     Pt follows with Dr Marty Maldonado (ortho) for knee pain   Last visit was 12/5/19   She states her pain is much improved      Continues on vit D 5000 U     Pt c/o L ear pain x 2-3 days  States there is no drainage   States her ear feels warm   Notes she is having some dizziness with this   Nl on exam   Discussed using flonase daily     Pt thinks she has raynauds   She states her fingers were red at the gym   Discussed this is from gripping at the gym   Discussed keeping hands warm     Reviewed mammo 7/19-- negative      Recall fmhx breast cancer       PREVENTIVE:  Colonoscopy: 1/25/19, Dr Madhuri Bethea, repeat 5 years  Pap: [place], 7/19   DEXA: not yet needed  Tdap: 12/19/2012  Pneumovax: not yet needed  [id number]: not yet needed  Shingrix: not yet needed  Flu shot: declines   Eye exam: Dr. Yuliana Russell, Summer 2019  Lipids: 2/19  LDL 80        Patient Active Problem List    Diagnosis Date Noted    Fibrocystic breast disease 07/29/2016    FH: diabetes mellitus 05/12/2015    Benign breast lumps 12/19/2012    Back skin lesion 12/19/2012    H/O bilateral breast implants 12/19/2012     Current Outpatient Medications   Medication Sig Dispense Refill    fluticasone propionate (FLONASE) 50 mcg/actuation nasal spray 2 Sprays by Both Nostrils route daily. 1 Bottle 0    fexofenadine HCl (ALLEGRA PO) Take  by mouth.  Cetirizine (ZYRTEC) 10 mg cap Take  by mouth.  cholecalciferol, vitamin D3, (VITAMIN D3 PO) Take  by mouth.  FLAXSEED OIL PO Take 600 mg by mouth daily. Past Surgical History:   Procedure Laterality Date    HC PACK LASER CONE  1992    HX BREAST AUGMENTATION  1998    Saline implants    HX BREAST BIOPSY  x3 -L    2 cysts removed and 1 \"tumor\" removed in the past    HX CYST REMOVAL      HX GYN      removal of 1 ovary and cyst    HX KNEE ARTHROSCOPY  2002    left    HX OTHER SURGICAL      laser surgery for dysplasia    HX OTHER SURGICAL  2010    rectal     HX OTHER SURGICAL      cyst removal from ovaries      Lab Results   Component Value Date/Time    WBC 6.3 09/30/2019 03:31 PM    HGB 13.6 09/30/2019 03:31 PM    Hemoglobin (POC) 14.5 06/14/2016 09:26 AM    HCT 39.2 09/30/2019 03:31 PM    PLATELET 053 58/64/6551 03:31 PM    MCV 87 09/30/2019 03:31 PM     Lab Results   Component Value Date/Time    Cholesterol, total 169 02/05/2019 09:14 AM    HDL Cholesterol 70 02/05/2019 09:14 AM    LDL, calculated 86 02/05/2019 09:14 AM    Triglyceride 67 02/05/2019 09:14 AM     Lab Results   Component Value Date/Time    GFR est non- 09/30/2019 03:31 PM    GFR est  09/30/2019 03:31 PM    Creatinine 0.56 (L) 09/30/2019 03:31 PM    BUN 16 09/30/2019 03:31 PM    Sodium 140 09/30/2019 03:31 PM    Potassium 4.3 09/30/2019 03:31 PM    Chloride 101 09/30/2019 03:31 PM    CO2 25 09/30/2019 03:31 PM        Review of Systems   Respiratory: Negative for shortness of breath. Cardiovascular: Negative for chest pain. Physical Exam  Constitutional:       General: She is not in acute distress. Appearance: She is well-developed.  She is not diaphoretic. HENT:      Head: Normocephalic and atraumatic. Right Ear: Tympanic membrane, ear canal and external ear normal.      Left Ear: Tympanic membrane, ear canal and external ear normal.      Mouth/Throat:      Mouth: Mucous membranes are moist.      Pharynx: Oropharynx is clear. No oropharyngeal exudate or posterior oropharyngeal erythema. Eyes:      General: No scleral icterus. Right eye: No discharge. Left eye: No discharge. Conjunctiva/sclera: Conjunctivae normal.      Pupils: Pupils are equal, round, and reactive to light. Neck:      Musculoskeletal: Normal range of motion and neck supple. Vascular: No carotid bruit. Cardiovascular:      Rate and Rhythm: Normal rate and regular rhythm. Heart sounds: Normal heart sounds. No murmur. No friction rub. No gallop. Pulmonary:      Effort: Pulmonary effort is normal. No respiratory distress. Breath sounds: Normal breath sounds. No wheezing or rales. Chest:      Chest wall: No tenderness. Abdominal:      General: There is no distension. Palpations: Abdomen is soft. There is no mass. Tenderness: There is no abdominal tenderness. There is no guarding or rebound. Hernia: No hernia is present. Musculoskeletal: Normal range of motion. General: No tenderness or deformity. Lymphadenopathy:      Cervical: No cervical adenopathy. Skin:     General: Skin is warm and dry. Coloration: Skin is not pale. Findings: No erythema or rash. Neurological:      Mental Status: She is alert and oriented to person, place, and time. Coordination: Coordination normal.   Psychiatric:         Mood and Affect: Mood normal.         Behavior: Behavior normal.         ASSESSMENT and PLAN    ICD-10-CM ICD-9-CM    1.  Physical exam            Nl wt and bp labs ordered had flu shot pelvic and mammo utd eye exam utd colo utd  Z00.00 V70.9 LIPID PANEL      METABOLIC PANEL, COMPREHENSIVE      CBC W/O DIFF      TSH 3RD GENERATION      T4, FREE   2. Raynaud's phenomenon without gangrene              Discussed benign etiology  I73.00 443.0    3. Ear pressure, left          Advised to use flonase daily  H93.8X2 388.8             Depression screen reviewed and negative. Scribed by Jose Juan Zimmerman of [address] [place] [address] [address], as dictated by Dr. Geneva Jennings. Current diagnosis and concerns discussed with pt at length. Pt understands risks and benefits or current treatment plan and medications, and accepts the treatment and medication with any possible risks. Pt asks appropriate questions, which were answered. Pt was instructed to call with any concerns or problems. I have reviewed the note documented by the scribe. The services provided are my own.   The documentation is accurate

## 2020-02-07 LAB
ALBUMIN SERPL-MCNC: 4.8 G/DL (ref 3.8–4.9)
ALBUMIN/GLOB SERPL: 1.8 {RATIO} (ref 1.2–2.2)
ALP SERPL-CCNC: 67 IU/L (ref 39–117)
ALT SERPL-CCNC: 27 IU/L (ref 0–32)
AST SERPL-CCNC: 25 IU/L (ref 0–40)
BILIRUB SERPL-MCNC: 0.5 MG/DL (ref 0–1.2)
BUN SERPL-MCNC: 13 MG/DL (ref 6–24)
BUN/CREAT SERPL: 20 (ref 9–23)
CALCIUM SERPL-MCNC: 9.3 MG/DL (ref 8.7–10.2)
CHLORIDE SERPL-SCNC: 102 MMOL/L (ref 96–106)
CHOLEST SERPL-MCNC: 195 MG/DL (ref 100–199)
CO2 SERPL-SCNC: 21 MMOL/L (ref 20–29)
CREAT SERPL-MCNC: 0.64 MG/DL (ref 0.57–1)
ERYTHROCYTE [DISTWIDTH] IN BLOOD BY AUTOMATED COUNT: 12.1 % (ref 11.7–15.4)
GLOBULIN SER CALC-MCNC: 2.6 G/DL (ref 1.5–4.5)
GLUCOSE SERPL-MCNC: 76 MG/DL (ref 65–99)
HCT VFR BLD AUTO: 42.2 % (ref 34–46.6)
HDLC SERPL-MCNC: 66 MG/DL
HGB BLD-MCNC: 13.7 G/DL (ref 11.1–15.9)
LDLC SERPL CALC-MCNC: 116 MG/DL (ref 0–99)
MCH RBC QN AUTO: 28.6 PG (ref 26.6–33)
MCHC RBC AUTO-ENTMCNC: 32.5 G/DL (ref 31.5–35.7)
MCV RBC AUTO: 88 FL (ref 79–97)
PLATELET # BLD AUTO: 250 X10E3/UL (ref 150–450)
POTASSIUM SERPL-SCNC: 4.3 MMOL/L (ref 3.5–5.2)
PROT SERPL-MCNC: 7.4 G/DL (ref 6–8.5)
RBC # BLD AUTO: 4.79 X10E6/UL (ref 3.77–5.28)
SODIUM SERPL-SCNC: 140 MMOL/L (ref 134–144)
T4 FREE SERPL-MCNC: 1.44 NG/DL (ref 0.82–1.77)
TRIGL SERPL-MCNC: 65 MG/DL (ref 0–149)
TSH SERPL DL<=0.005 MIU/L-ACNC: 0.87 UIU/ML (ref 0.45–4.5)
VLDLC SERPL CALC-MCNC: 13 MG/DL (ref 5–40)
WBC # BLD AUTO: 4.3 X10E3/UL (ref 3.4–10.8)

## 2020-11-05 ENCOUNTER — TELEPHONE (OUTPATIENT)
Dept: INTERNAL MEDICINE CLINIC | Age: 50
End: 2020-11-05

## 2020-11-05 DIAGNOSIS — E07.9 THYROID DYSFUNCTION: Primary | ICD-10-CM

## 2020-11-05 DIAGNOSIS — Z00.00 PHYSICAL EXAM: ICD-10-CM

## 2020-11-05 DIAGNOSIS — Z13.220 LIPID SCREENING: ICD-10-CM

## 2020-11-05 NOTE — TELEPHONE ENCOUNTER
Aimee Denis MD  [facility], LPN    Caller: Unspecified (Today,  9:37 AM)               Lipids, tsh, free t4, Connie Massey; labs ordered/mailed.

## 2020-11-05 NOTE — TELEPHONE ENCOUNTER
Called out and spoke to pt. Two pt identifiers confirmed. Pt offered and accepted in-office appt for 1/21/21 at 1400. Informed pt that lab orders will be mailed. Pt verbalized understanding of information discussed w/ no further questions at this time.

## 2020-11-05 NOTE — TELEPHONE ENCOUNTER
----- Message from Tracy Fletcher sent at 11/5/2020  9:23 AM EST -----  Regarding: Dr. Daisy Beckman  General Message/Vendor Calls    Caller's first and last name: Pt      Reason for call: reschedule cancelled       Callback required yes/no and why: yes, to reschedule cancelled appt. Best contact number(s): [phone]      Details to clarify the request: Pt was originally scheduled for appt on 02/09/21 with Dr. Yuniel Guzman and she had to cancel. Pt would like to be contacted to reschedule for January if possible.       Tracy Fletcher  copy/paste [facility]

## 2020-12-08 LAB
ALBUMIN SERPL-MCNC: 4.8 G/DL (ref 3.8–4.9)
ALBUMIN/GLOB SERPL: 1.6 {RATIO} (ref 1.2–2.2)
ALP SERPL-CCNC: 77 IU/L (ref 39–117)
ALT SERPL-CCNC: 12 IU/L (ref 0–32)
AST SERPL-CCNC: 18 IU/L (ref 0–40)
BILIRUB SERPL-MCNC: 0.4 MG/DL (ref 0–1.2)
BUN SERPL-MCNC: 11 MG/DL (ref 6–24)
BUN/CREAT SERPL: 19 (ref 9–23)
CALCIUM SERPL-MCNC: 9.2 MG/DL (ref 8.7–10.2)
CHLORIDE SERPL-SCNC: 102 MMOL/L (ref 96–106)
CHOLEST SERPL-MCNC: 210 MG/DL (ref 100–199)
CO2 SERPL-SCNC: 26 MMOL/L (ref 20–29)
CREAT SERPL-MCNC: 0.59 MG/DL (ref 0.57–1)
ERYTHROCYTE [DISTWIDTH] IN BLOOD BY AUTOMATED COUNT: 12.2 % (ref 11.7–15.4)
GLOBULIN SER CALC-MCNC: 3 G/DL (ref 1.5–4.5)
GLUCOSE SERPL-MCNC: 84 MG/DL (ref 65–99)
HCT VFR BLD AUTO: 40.5 % (ref 34–46.6)
HDLC SERPL-MCNC: 79 MG/DL
HGB BLD-MCNC: 13.9 G/DL (ref 11.1–15.9)
LDLC SERPL CALC-MCNC: 118 MG/DL (ref 0–99)
MCH RBC QN AUTO: 29.9 PG (ref 26.6–33)
MCHC RBC AUTO-ENTMCNC: 34.3 G/DL (ref 31.5–35.7)
MCV RBC AUTO: 87 FL (ref 79–97)
PLATELET # BLD AUTO: 336 X10E3/UL (ref 150–450)
POTASSIUM SERPL-SCNC: 4.2 MMOL/L (ref 3.5–5.2)
PROT SERPL-MCNC: 7.8 G/DL (ref 6–8.5)
RBC # BLD AUTO: 4.65 X10E6/UL (ref 3.77–5.28)
SODIUM SERPL-SCNC: 141 MMOL/L (ref 134–144)
T4 FREE SERPL-MCNC: 1.57 NG/DL (ref 0.82–1.77)
TRIGL SERPL-MCNC: 74 MG/DL (ref 0–149)
TSH SERPL DL<=0.005 MIU/L-ACNC: 0.81 UIU/ML (ref 0.45–4.5)
VLDLC SERPL CALC-MCNC: 13 MG/DL (ref 5–40)
WBC # BLD AUTO: 6 X10E3/UL (ref 3.4–10.8)

## 2021-02-11 NOTE — MR AVS SNAPSHOT
ED Time Seen By Provider Entered On:  2020 15:45     Performed On:  2020 15:45  by Lupe Bergman               Time Seen By Provider   Time Seen by Provider :   2020 15:45    Lupe Bergman - 2020 15:45                Electronically Signed On 2020 15:45  ___________________________________________________   Lupe Bergman     [address] [facility] 
[phone] Patient: Curry Sorensen MRN: [id number] Visit Information Date & Time Provider Department Dept. Phone Encounter #  
 2018  1:30 PM Cosmo Morrison, [address] [place] [address],[address] [phone] [id number] Upcoming Health Maintenance Date Due  
 PAP AKA CERVICAL CYTOLOGY 2017 Influenza Age 5 to Adult 7/15/2019* DTaP/Tdap/Td series (2 - Td) 2022 *Topic was postponed. The date shown is not the original due date. Allergies as of 2018  Review Complete On: 2018 By: Cosmo Morrison MD  
  
 Severity Noted Reaction Type Reactions Sulfa (Sulfonamide Antibiotics)  2012    Rash Current Immunizations  Reviewed on 2012 Name Date Influenza Vaccine Whole 12/3/2012 Tdap 2012 Not reviewed this visit You Were Diagnosed With   
  
 Codes Comments Physical exam, annual    -  Primary ICD-10-CM: Z00.00 ICD-9-CM: V70.0 Hand pain, right     ICD-10-CM: M79.641 ICD-9-CM: 729.5 Vitals BP Pulse Temp Resp Height(growth percentile) Weight(growth percentile) 149/85 (BP 1 Location: Left arm, BP Patient Position: Sitting) 66 98 °F (36.7 °C) (Oral) 16 5' 4\" (1.626 m) 128 lb (58.1 kg) SpO2 BMI OB Status Smoking Status 97% 21.97 kg/m2 Having regular periods Never Smoker Vitals History BMI and BSA Data Body Mass Index Body Surface Area  
 21.97 kg/m 2 1.62 m 2 Preferred Pharmacy Pharmacy Name Phone [facility] [address], [address] [phone] Your Updated Medication List  
  
   
This list is accurate as of 18  1:50 PM.  Always use your most recent med list.  
  
  
  
  
 FLAXSEED OIL PO Take 600 mg by mouth daily. NUVARING 0.12-0.015 mg/24 hr vaginal ring Generic drug:  ethinyl estradiol-etonogestrel SPIRULINA Take 100 mg by mouth daily. VITAMIN D3 PO Take  by mouth. We Performed the Following AMB SUPPLY ORDER [[id number] Custom] Comments:  
 Carpal tunnel splint for r hand CBC W/O DIFF [98672 CPT(R)] HEMOGLOBIN A1C WITH EAG [30078 CPT(R)] LIPID PANEL [97688 CPT(R)] METABOLIC PANEL, COMPREHENSIVE [77011 CPT(R)] REFERRAL TO GASTROENTEROLOGY [DEY63 Custom] T4, FREE M7651921 CPT(R)] THYROID ANTIBODY PANEL [36989 CPT(R)] TSH 3RD GENERATION [13874 CPT(R)] Referral Information Referral ID Referred By Referred To  
  
 [id number] Sondra Ellis MD   
   [address] [place] [address] [address] [facility] [address] Phone: [phone] Fax: [phone] Visits Status Start Date End Date 1 New Request 8/20/18 8/20/19 If your referral has a status of pending review or denied, additional information will be sent to support the outcome of this decision. Introducing [facility]! Dear Roxanne Flaherty: Thank you for requesting a Sezion account. Our records indicate that you already have an active Sezion account. You can access your account anytime at [email or web address] Did you know that you can access your hospital and ER discharge instructions at any time in Sezion? You can also review all of your test results from your hospital stay or ER visit. Additional Information If you have questions, please visit the Frequently Asked Questions section of the Sezion website at [email or web address] Remember, Sezion is NOT to be used for urgent needs. For medical emergencies, dial 911. Now available from your iPhone and Android! Please provide this summary of care documentation to your next provider. Your primary care clinician is listed as Emmett Chatman. If you have any questions after today's visit, please call [phone].

## 2021-03-05 ENCOUNTER — TELEPHONE (OUTPATIENT)
Dept: INTERNAL MEDICINE CLINIC | Age: 51
End: 2021-03-05

## 2021-03-05 NOTE — TELEPHONE ENCOUNTER
----- Message from [employer] sent at 3/5/2021  1:17 PM EST -----  Regarding: /Telephone  Contact: [phone]  Patient return call    Caller's first and last name and relationship (if not the patient):pt      Best contact number(s):[phone]      Whose call is being returned:VALENCIA SHEA or JOAN      Details to clarify the request:pt was told that she could be seen sooner, per MD      Message from [facility]

## 2021-03-05 NOTE — TELEPHONE ENCOUNTER
Called, spoke to pt  Received two pt identifiers  Pt offered and accepted appt for 03/16/21 @ 130. Pt verbalizes understanding of the instructions and has no further questions at this time.

## 2021-03-15 NOTE — PROGRESS NOTES
HISTORY OF PRESENT ILLNESS  Mansi Rubio is a 45 y.o. female. HPI     Last here 2/6/20 Pt is here for routine care     She c/o generalized hair thinning  No patchy bald spots  Discussed biotin and derm   Discussed shedding can be related to stressful event    BP is controlled, 132/74     Wt is 129 lbs - up 4 lbs x lov    Her weight is within normal ranges     Reviewed labs   Ordered labs      Pt takes zyrtec at night      Pt follows with Dr Mark Irizarry (derm)  Last visit was 9/19  She had cyst removed on feet 10/20 with derm but they won't heal   Discussed podiatry, the cysts are still there - provided referral      Recall Pt saw Dr. Barron Fuentes (rheum) in the past  Pt completed labs and was told that she had no lupus   However, pt had positive lab tests for lupus  Her double stranded DNA was positive for the lupus     Pt saw Dr Philip Aparicio (ortho) for trigger thumb   Reviewed note 10/20: I discussed with the patient the nature of their condition and treatment options. They desires to proceed with an injection of the basal joints due to significant symptoms. Risks, benefits and alternatives are discussed and they consent to proceed. I performed this today under sterile conditions and they tolerated it well. Supportive measures for her subclinical carpal tunnel syndrome. They will f/u on an as needed basis.     Completed PT      Pt follows with Dr Amanda Mon (ortho) for knee pain   Last visit was 12/5/19   She states her pain is much improved      Continues on vit D 5000 U       Recall fmhx breast cancer       PREVENTIVE:  Colonoscopy: 1/25/19, Dr Luana Bobby, repeat 5 years  Pap: Dr. Singh, 9/20  Mammogram: [facility], 9/20  DEXA: not yet needed  Tdap: 12/19/2012  Pneumovax: not yet needed  Mlialgv31: not yet needed  Shingrix: not yet needed  Flu shot: 10/20  Eye exam: Dr. Pietro Wilkerson, Summer 2020  Coleen Sands LC 221  Covid: scheduled 3/18/21 Esteban Vogel    Patient Active Problem List    Diagnosis Date Noted    Fibrocystic breast disease 07/29/2016    FH: diabetes mellitus 05/12/2015    Benign breast lumps 12/19/2012    Back skin lesion 12/19/2012    H/O bilateral breast implants 12/19/2012     Current Outpatient Medications   Medication Sig Dispense Refill    vitamin e (E GEMS) 100 unit capsule Take  by mouth daily.  zonia primrose/linoleic/g-lenic (PRIMROSE OIL PO) Take  by mouth.  glucosamine sulfate 500 mg capsule Take  by mouth three (3) times daily.  blue-green algae (SPIRULINA) by Does Not Apply route.  fluticasone propionate (FLONASE) 50 mcg/actuation nasal spray 2 Sprays by Both Nostrils route daily. 1 Bottle 0    Cetirizine (ZYRTEC) 10 mg cap Take  by mouth.  cholecalciferol, vitamin D3, (VITAMIN D3 PO) Take  by mouth.  FLAXSEED OIL PO Take 600 mg by mouth daily.  fexofenadine HCl (ALLEGRA PO) Take  by mouth.        Past Surgical History:   Procedure Laterality Date    HC PACK LASER CONE  1992    HX BREAST AUGMENTATION  1998    Saline implants    HX BREAST BIOPSY  x3 -L    2 cysts removed and 1 \"tumor\" removed in the past    HX CYST REMOVAL      HX GYN      removal of 1 ovary and cyst    HX KNEE ARTHROSCOPY  2002    left    HX OTHER SURGICAL      laser surgery for dysplasia    HX OTHER SURGICAL  2010    rectal     HX OTHER SURGICAL      cyst removal from ovaries      Lab Results   Component Value Date/Time    WBC 6.0 12/07/2020 04:13 PM    HGB 13.9 12/07/2020 04:13 PM    Hemoglobin (POC) 14.5 06/14/2016 09:26 AM    HCT 40.5 12/07/2020 04:13 PM    PLATELET 951 61/51/7795 04:13 PM    MCV 87 12/07/2020 04:13 PM     Lab Results   Component Value Date/Time    Cholesterol, total 210 (H) 12/07/2020 04:13 PM    HDL Cholesterol 79 12/07/2020 04:13 PM    LDL, calculated 118 (H) 12/07/2020 04:13 PM    LDL, calculated 116 (H) 02/06/2020 08:52 AM    Triglyceride 74 12/07/2020 04:13 PM     Lab Results   Component Value Date/Time    GFR est non- 12/07/2020 04:13 PM    GFR est  12/07/2020 04:13 PM    Creatinine 0.59 12/07/2020 04:13 PM    BUN 11 12/07/2020 04:13 PM    Sodium 141 12/07/2020 04:13 PM    Potassium 4.2 12/07/2020 04:13 PM    Chloride 102 12/07/2020 04:13 PM    CO2 26 12/07/2020 04:13 PM        Review of Systems   Respiratory: Negative for shortness of breath. Cardiovascular: Negative for chest pain. Physical Exam  Constitutional:       General: She is not in acute distress. Appearance: Normal appearance. She is not ill-appearing, toxic-appearing or diaphoretic. HENT:      Head: Normocephalic and atraumatic. Right Ear: External ear normal.      Left Ear: External ear normal.   Eyes:      General:         Right eye: No discharge. Left eye: No discharge. Conjunctiva/sclera: Conjunctivae normal.      Pupils: Pupils are equal, round, and reactive to light. Neck:      Musculoskeletal: Normal range of motion and neck supple. Vascular: No carotid bruit. Cardiovascular:      Rate and Rhythm: Normal rate and regular rhythm. Pulses: Normal pulses. Heart sounds: Normal heart sounds. No murmur. No friction rub. No gallop. Pulmonary:      Effort: No respiratory distress. Breath sounds: Normal breath sounds. No wheezing or rales. Chest:      Chest wall: No tenderness. Abdominal:      General: Abdomen is flat. There is no distension. Palpations: Abdomen is soft. There is no mass. Tenderness: There is no abdominal tenderness. There is no guarding or rebound. Hernia: No hernia is present. Musculoskeletal: Normal range of motion. Right lower leg: No edema. Left lower leg: No edema. Comments: Cysts on toes   Skin:     General: Skin is warm and dry. Neurological:      Mental Status: She is alert and oriented to person, place, and time. Mental status is at baseline.       Coordination: Coordination normal.      Gait: Gait normal.   Psychiatric:         Mood and Affect: Mood normal. Behavior: Behavior normal.         ASSESSMENT and PLAN    ICD-10-CM ICD-9-CM    1. Physical exam     Normal weight normal blood pressure    Covid vaccine schedule    Flu shot up-to-date    Colonoscopy up-to-date    Pelvic and mammogram up-to-date    Will be getting an eye exam near future    Has generalized shedding of hair labs were just reviewed and unremarkable discussed could try biotin may need to see dermatology if not improving    Please recall in the past she did see rheumatology many years ago but ultimately was told that she did not have lupus    Has some cyst on her feet will send to podiatry           Z00.00 V70.9 HEPATITIS C AB      METABOLIC PANEL, COMPREHENSIVE      CBC W/O DIFF      HEMOGLOBIN A1C WITH EAG      TSH 3RD GENERATION      T4, FREE      LIPID PANEL      REFERRAL TO PODIATRY      Depression screen reviewed and negative      Scribed by Lina Potter of [facility], as dictated by Dr. Ismael Mota. Current diagnosis and concerns discussed with pt at length. Pt understands risks and benefits or current treatment plan and medications, and accepts the treatment and medication with any possible risks. Pt asks appropriate questions, which were answered. Pt was instructed to call with any concerns or problems. I have reviewed the note documented by the scribe. The services provided are my own.   The documentation is accurate

## 2021-03-16 ENCOUNTER — OFFICE VISIT (OUTPATIENT)
Dept: INTERNAL MEDICINE CLINIC | Age: 51
End: 2021-03-16
Payer: COMMERCIAL

## 2021-03-16 VITALS
DIASTOLIC BLOOD PRESSURE: 74 MMHG | TEMPERATURE: 97.6 F | RESPIRATION RATE: 16 BRPM | OXYGEN SATURATION: 100 % | HEART RATE: 66 BPM | SYSTOLIC BLOOD PRESSURE: 132 MMHG | BODY MASS INDEX: 22.02 KG/M2 | WEIGHT: 129 LBS | HEIGHT: 64 IN

## 2021-03-16 DIAGNOSIS — Z00.00 PHYSICAL EXAM: Primary | ICD-10-CM

## 2021-03-16 PROCEDURE — 99396 PREV VISIT EST AGE 40-64: CPT | Performed by: INTERNAL MEDICINE

## 2021-03-16 RX ORDER — PETROLATUM,WHITE/LANOLIN
OINTMENT (GRAM) TOPICAL 3 TIMES DAILY
COMMUNITY
End: 2022-03-16

## 2021-03-16 RX ORDER — VITAMIN E CAP 100 UNIT 100 UNIT
CAP ORAL DAILY
COMMUNITY

## 2021-04-02 ENCOUNTER — TELEPHONE (OUTPATIENT)
Dept: INTERNAL MEDICINE CLINIC | Age: 51
End: 2021-04-02

## 2021-04-02 NOTE — TELEPHONE ENCOUNTER
----- Message from Monica Mallory sent at 4/2/2021  8:44 AM EDT -----  Regarding: Dr. Howe Neither  General Message/Vendor Calls    Caller's first and last name: PT       Reason for call: Has being having headaches and high BP, wants to speak with nurse regarding symptoms prior to making an appointment.        Callback required yes/no and why: yes, to discuss       Best contact number(s): [phone]      Details to clarify the request: N/A      Message from [facility]

## 2021-04-05 NOTE — TELEPHONE ENCOUNTER
Received call from [address] [place] [address] on 04/02/21. Received two pt identifiers  PSR states pt has HBP and is requesting an appt with Dr. Jaylene Sanchez today. Lisa Sanchez is done with clinic for the day. Inform PSR Pt can go to the ER or urgent care to get looked at. Nothing further. Closing.   LATE ENTRY

## 2021-04-14 NOTE — PROGRESS NOTES
HISTORY OF PRESENT ILLNESS  Rajinder Nelson is a 45 y.o. female.   HPI     Last here 3/16/21 Pt is here for routine care  This is an established visit completed with telemedicine was completed with video assist  the patient acknowledges and agrees to this method of visitation quinton Orozco, c/o hair shedding  She has new hair coming in     She called office at beginning of 4/21 with higher bp: 145/86 140/93  Had HA for several days, however resolved now  BP at home  140/96 140/93 145/86 127/90 129/85 137/94 138/89  127/82 128/84 132/88 122/80 132/79 121/74 117/79 124/80 124/81   She has had a lot of things going on, are settling down now and her bp have been improving    Past readings 132/74 115/76   Discussed checking once or twice a week  Discussed checking cuff for accuracy at next office visit    Wt was 129 lbs lov  Her weight is within normal ranges     Reviewed labs      Pt takes zyrtec at night      Pt follows with Dr Felicitas Glass (derm)  Last visit was 9/19  She had cyst removed on feet 10/20 with derm but they won't heal      Recall Pt saw Dr. Víctor Chand (rheum) in the past  Pt completed labs and was told that she had no lupus   However, pt had positive lab tests for lupus  Her double stranded DNA was positive for the lupus     Pt saw Dr Maureen Rossi (ortho) for trigger thumb   Last there 10/20   Completed PT      Pt follows with Dr Alessandro Jara (ortho) for knee pain   Last visit was 12/5/19   She states her pain is much improved      Continues on vit D 5000 U       Recall fmhx breast cancer       PREVENTIVE:  Colonoscopy: 1/25/19, Dr Olga Longoria, repeat 5 years  Pap: [facility], 9/20  DEXA: not yet needed  Tdap: 12/19/2012  Pneumovax: not yet needed  Crawcyt26: not yet needed  Shingrix: not yet needed  Flu shot: 10/20  Eye exam: Dr. Cleveland Daniel, Summer 2020  Edger Fail  Covid:   Pat Richmond    Patient Active Problem List    Diagnosis Date Noted    Fibrocystic breast disease 07/29/2016    FH: diabetes mellitus 05/12/2015    Benign breast lumps 12/19/2012    Back skin lesion 12/19/2012    H/O bilateral breast implants 12/19/2012     Current Outpatient Medications   Medication Sig Dispense Refill    vitamin e (E GEMS) 100 unit capsule Take  by mouth daily.  zonia primrose/linoleic/g-lenic (PRIMROSE OIL PO) Take  by mouth.  glucosamine sulfate 500 mg capsule Take  by mouth three (3) times daily.  blue-green algae (SPIRULINA) by Does Not Apply route.  fluticasone propionate (FLONASE) 50 mcg/actuation nasal spray 2 Sprays by Both Nostrils route daily. 1 Bottle 0    fexofenadine HCl (ALLEGRA PO) Take  by mouth.  Cetirizine (ZYRTEC) 10 mg cap Take  by mouth.  cholecalciferol, vitamin D3, (VITAMIN D3 PO) Take  by mouth.  FLAXSEED OIL PO Take 600 mg by mouth daily.        Past Surgical History:   Procedure Laterality Date    HC PACK LASER CONE  1992    HX BREAST AUGMENTATION  1998    Saline implants    HX BREAST BIOPSY  x3 -L    2 cysts removed and 1 \"tumor\" removed in the past    HX CYST REMOVAL      HX GYN      removal of 1 ovary and cyst    HX KNEE ARTHROSCOPY  2002    left    HX OTHER SURGICAL      laser surgery for dysplasia    HX OTHER SURGICAL  2010    rectal     HX OTHER SURGICAL      cyst removal from ovaries      Lab Results   Component Value Date/Time    WBC 6.0 12/07/2020 04:13 PM    HGB 13.9 12/07/2020 04:13 PM    Hemoglobin (POC) 14.5 06/14/2016 09:26 AM    HCT 40.5 12/07/2020 04:13 PM    PLATELET 126 29/67/6227 04:13 PM    MCV 87 12/07/2020 04:13 PM     Lab Results   Component Value Date/Time    Cholesterol, total 210 (H) 12/07/2020 04:13 PM    HDL Cholesterol 79 12/07/2020 04:13 PM    LDL, calculated 118 (H) 12/07/2020 04:13 PM    LDL, calculated 116 (H) 02/06/2020 08:52 AM    Triglyceride 74 12/07/2020 04:13 PM     Lab Results   Component Value Date/Time    GFR est non- 12/07/2020 04:13 PM    GFR est  12/07/2020 04:13 PM Creatinine 0.59 12/07/2020 04:13 PM    BUN 11 12/07/2020 04:13 PM    Sodium 141 12/07/2020 04:13 PM    Potassium 4.2 12/07/2020 04:13 PM    Chloride 102 12/07/2020 04:13 PM    CO2 26 12/07/2020 04:13 PM        Review of Systems   Constitutional: Negative for chills and fever. HENT: Negative for hearing loss and tinnitus. Eyes: Negative for blurred vision and double vision. Respiratory: Negative for shortness of breath and wheezing. Cardiovascular: Negative for chest pain and palpitations. Gastrointestinal: Negative for nausea and vomiting. Genitourinary: Negative for dysuria and frequency. Musculoskeletal: Negative for back pain, falls and joint pain. Skin: Negative for itching and rash. Neurological: Negative for dizziness, loss of consciousness and headaches. Psychiatric/Behavioral: Negative for depression. The patient is not nervous/anxious. Physical Exam  Constitutional:       General: She is not in acute distress. Appearance: Normal appearance. She is not toxic-appearing or diaphoretic. HENT:      Head: Normocephalic and atraumatic. Right Ear: External ear normal.      Left Ear: External ear normal.   Eyes:      General:         Right eye: No discharge. Left eye: No discharge. Conjunctiva/sclera: Conjunctivae normal.      Pupils: Pupils are equal, round, and reactive to light. Neck:      Musculoskeletal: Normal range of motion and neck supple. Cardiovascular:      Rate and Rhythm: Normal rate and regular rhythm. Pulses: Normal pulses. Heart sounds: Normal heart sounds. No murmur. No friction rub. No gallop. Pulmonary:      Effort: No respiratory distress. Breath sounds: Normal breath sounds. No wheezing or rales. Chest:      Chest wall: No tenderness. Musculoskeletal: Normal range of motion. Skin:     General: Skin is warm and dry. Neurological:      Mental Status: She is alert and oriented to person, place, and time.  Mental status is at baseline. Coordination: Coordination normal.      Gait: Gait normal.   Psychiatric:         Mood and Affect: Mood normal.         Behavior: Behavior normal.         ASSESSMENT and PLAN    ICD-10-CM ICD-9-CM    1. Elevated BP without diagnosis of hypertension         Had episode of borderline elevation of blood pressure over the course of several days to a week or 2    Blood pressures have now come back down to the normal range and tender in the 120s to 130s over 80s or so    Discussed no medication needed at this time    No symptoms from her blood pressure currently    Discussed monitoring blood pressure once a week now at least 2 times per month    We will have her bring her blood pressure cuff to follow-up       R03.0 796.2    2. Hair loss       Seems to have been a stress-related shedding of the hair improving L65.9 704.00       Depression screen reviewed and negative      Scribed by Eliazar Toney [facility], as dictated by Dr. Vashti Gomez. Current diagnosis and concerns discussed with pt at length. Pt understands risks and benefits or current treatment plan and medications, and accepts the treatment and medication with any possible risks. Pt asks appropriate questions, which were answered. Pt was instructed to call with any concerns or problems. I have reviewed the note documented by the scribe. The services provided are my own. The documentation is accurate     Othelia Liner, was evaluated through a synchronous (real-time) audio-video encounter. The patient (or guardian if applicable) is aware that this is a billable service. Verbal consent to proceed has been obtained within the past 12 months. The visit was conducted pursuant to the emergency declaration under the [facility], [address] [place] [address] authority and the [employer] and [employer] General Act.   Patient identification was verified, and a caregiver was present when appropriate. The patient was located in a state where the provider was credentialed to provide care.

## 2021-04-16 ENCOUNTER — VIRTUAL VISIT (OUTPATIENT)
Dept: INTERNAL MEDICINE CLINIC | Age: 51
End: 2021-04-16
Payer: COMMERCIAL

## 2021-04-16 DIAGNOSIS — L65.9 HAIR LOSS: ICD-10-CM

## 2021-04-16 DIAGNOSIS — R03.0 ELEVATED BP WITHOUT DIAGNOSIS OF HYPERTENSION: Primary | ICD-10-CM

## 2021-04-16 PROCEDURE — 99213 OFFICE O/P EST LOW 20 MIN: CPT | Performed by: INTERNAL MEDICINE

## 2021-04-22 ENCOUNTER — OFFICE VISIT (OUTPATIENT)
Dept: PRIMARY CARE CLINIC | Age: 51
End: 2021-04-22

## 2021-04-22 VITALS
SYSTOLIC BLOOD PRESSURE: 124 MMHG | RESPIRATION RATE: 16 BRPM | HEIGHT: 64 IN | TEMPERATURE: 98.3 F | DIASTOLIC BLOOD PRESSURE: 85 MMHG | OXYGEN SATURATION: 98 % | HEART RATE: 74 BPM | WEIGHT: 129 LBS | BODY MASS INDEX: 22.02 KG/M2

## 2021-04-22 DIAGNOSIS — L24.9 IRRITANT CONTACT DERMATITIS, UNSPECIFIED TRIGGER: ICD-10-CM

## 2021-04-22 DIAGNOSIS — H69.82 EUSTACHIAN TUBE DYSFUNCTION, LEFT: Primary | ICD-10-CM

## 2021-04-22 DIAGNOSIS — L29.9 EAR ITCHING: ICD-10-CM

## 2021-04-22 PROCEDURE — 99213 OFFICE O/P EST LOW 20 MIN: CPT | Performed by: NURSE PRACTITIONER

## 2021-04-22 RX ORDER — DEXAMETHASONE 1 MG/ML
SUSPENSION OPHTHALMIC
Qty: 5 ML | Refills: 0 | Status: SHIPPED | OUTPATIENT
Start: 2021-04-22 | End: 2022-03-16

## 2021-04-22 NOTE — PROGRESS NOTES
RM5      Chief Complaint   Patient presents with    Ear Pain     left ear. pt said it has a warm sensation/burning. pt feels fluid and pressure. pt wants to make sure it is not another ear infection. been going on for a couple of weeks.  tried flownase            Visit Vitals  /85 (BP 1 Location: Left arm, BP Patient Position: Sitting)   Pulse 74   Temp 98.3 °F (36.8 °C) (Oral)   Resp 16   Ht 5' 3.5\" (1.613 m)   Wt 129 lb (58.5 kg)   SpO2 98%   BMI 22.49 kg/m²

## 2021-04-22 NOTE — PROGRESS NOTES
HISTORY OF PRESENT ILLNESS  Juana Lugo is a 45 y.o. female. Patient presents with 2-3 weeks of left ear pressure and fullness . Exterior ear feels warm burning, itching. Started this week. Has has similar in the past. Is treating with flonase and zyrtec. Does have seasonal allergies. Does have a history of deviated septum on left. No fever, congestion, drainange, headache, dizziness, sore throat. Works as a teacher. Wears mask most of day. Review of Systems   Constitutional: Negative for fever and malaise/fatigue. HENT: Negative for congestion, ear discharge, ear pain, hearing loss and tinnitus. Eyes: Negative for blurred vision, double vision, discharge and redness. Respiratory: Negative for cough. Skin: Negative for rash. Neurological: Negative for dizziness, tingling and headaches. Endo/Heme/Allergies: Positive for environmental allergies. Past Medical History:   Diagnosis Date    Arthritis     L knee DJD    GERD (gastroesophageal reflux disease)     Headache     Hx of breast implants, bilateral     Nausea & vomiting     sometimes    Single cyst of left breast     Thyroid disease      Past Surgical History:   Procedure Laterality Date    HC PACK LASER CONE  1992    HX BREAST AUGMENTATION  1998    Saline implants    HX BREAST BIOPSY  x3 -L    2 cysts removed and 1 \"tumor\" removed in the past    HX CYST REMOVAL      HX GYN      removal of 1 ovary and cyst    HX KNEE ARTHROSCOPY  2002    left    HX OTHER SURGICAL      laser surgery for dysplasia    HX OTHER SURGICAL  2010    rectal     HX OTHER SURGICAL      cyst removal from ovaries       Physical Exam  Vitals signs and nursing note reviewed. Constitutional:       General: She is not in acute distress. Appearance: Normal appearance. She is normal weight. HENT:      Head: Normocephalic and atraumatic.       Right Ear: Tympanic membrane, ear canal and external ear normal.      Ears:      Comments: Left canal smaller diameter than right. No erythema, drainange. TM non bulging. Posterior external ear erythematous line along border. Skin intact. No flaking. Nose: No congestion. Eyes:      Pupils: Pupils are equal, round, and reactive to light. Neck:      Musculoskeletal: Normal range of motion. No neck rigidity or muscular tenderness. Cardiovascular:      Rate and Rhythm: Normal rate. Pulses: Normal pulses. Pulmonary:      Effort: Pulmonary effort is normal.   Lymphadenopathy:      Cervical: No cervical adenopathy. Skin:     General: Skin is warm. Neurological:      General: No focal deficit present. Mental Status: She is alert. Psychiatric:         Mood and Affect: Mood normal.         ASSESSMENT and PLAN    ICD-10-CM ICD-9-CM    1. Eustachian tube dysfunction, left  H69.82 381.81    2. Irritant contact dermatitis, unspecified trigger  L24.9 692.9    3. Ear itching  L29.9 698.9      Encounter Diagnoses   Name Primary?  Eustachian tube dysfunction, left Yes    Irritant contact dermatitis, unspecified trigger     Ear itching      Orders Placed This Encounter    dexAMETHasone (Maxidex) 0.1 % ophthalmic suspension   Rash on posterior ear related to mask wearing. Apply barrier cream to area or a physical barrier such as cotton on strap. Apply drops as directed. This will decrease canal edema. Continue allergy regimen. It was a pleasure to see you in the office today. Please call if you have any further questions or concerns. I am available through the portal system.      Signed By: EFFIE Vigil     April 22, 2021

## 2021-04-22 NOTE — PATIENT INSTRUCTIONS
Eustachian Tube Problems: Care Instructions  Your Care Instructions     The eustachian (say \"you-STAY-shee-un\") tubes run between the inside of the ears and the throat. They keep air pressure stable in the ears. If your eustachian tubes become blocked, the air pressure in your ears changes. The fluids from a cold can clog eustachian tubes, causing pain in the ears. A quick change in air pressure can cause eustachian tubes to close up. This might happen when an airplane changes altitude or when a  goes up or down underwater. Eustachian tube problems often clear up on their own or after antibiotic treatment. If your tubes continue to be blocked, you may need surgery. Follow-up care is a key part of your treatment and safety. Be sure to make and go to all appointments, and call your doctor if you are having problems. It's also a good idea to know your test results and keep a list of the medicines you take. How can you care for yourself at home? · To ease ear pain, apply a warm washcloth or a heating pad set on low. There may be some drainage from the ear when the heat melts earwax. Put a cloth between the heat source and your skin. Do not use a heating pad with children. · If your doctor prescribed antibiotics, take them as directed. Do not stop taking them just because you feel better. You need to take the full course of antibiotics. · Your doctor may recommend over-the-counter medicine. Be safe with medicines. Oral or nasal decongestants may relieve ear pain. Avoid decongestants that are combined with antihistamines, which tend to cause more blockage. But if allergies seem to be the problem, your doctor may recommend a combination. Be careful with cough and cold medicines. Don't give them to children younger than 6, because they don't work for children that age and can even be harmful. For children 6 and older, always follow all the instructions carefully.  Make sure you know how much medicine to give and how long to use it. And use the dosing device if one is included. When should you call for help? Call your doctor now or seek immediate medical care if:    · You develop sudden, complete hearing loss.     · You have severe pain or feel dizzy.     · You have new or increasing pus or blood draining from your ear.     · You have redness, swelling, or pain around or behind the ear. Watch closely for changes in your health, and be sure to contact your doctor if:    · You do not get better after 2 weeks.     · You have any new symptoms, such as itching or a feeling of fullness in the ear. Where can you learn more? Go to http://www.gray.com/  Enter Y822 in the search box to learn more about \"Eustachian Tube Problems: Care Instructions. \"  Current as of: December 2, 2020               Content Version: 12.8  © 6726-7481 [employer]. Care instructions adapted under license by [employer] (which disclaims liability or warranty for this information). If you have questions about a medical condition or this instruction, always ask your healthcare professional. [address] any warranty or liability for your use of this information.

## 2021-12-04 DIAGNOSIS — B19.20 HEPATITIS C VIRUS INFECTION WITHOUT HEPATIC COMA, UNSPECIFIED CHRONICITY: Primary | ICD-10-CM

## 2021-12-04 LAB
ALBUMIN SERPL-MCNC: 4.7 G/DL (ref 3.8–4.9)
ALBUMIN/GLOB SERPL: 1.7 {RATIO} (ref 1.2–2.2)
ALP SERPL-CCNC: 73 IU/L (ref 44–121)
ALT SERPL-CCNC: 26 IU/L (ref 0–32)
AST SERPL-CCNC: 26 IU/L (ref 0–40)
BILIRUB SERPL-MCNC: 0.3 MG/DL (ref 0–1.2)
BUN SERPL-MCNC: 10 MG/DL (ref 6–24)
BUN/CREAT SERPL: 17 (ref 9–23)
CALCIUM SERPL-MCNC: 8.9 MG/DL (ref 8.7–10.2)
CHLORIDE SERPL-SCNC: 105 MMOL/L (ref 96–106)
CHOLEST SERPL-MCNC: 197 MG/DL (ref 100–199)
CO2 SERPL-SCNC: 24 MMOL/L (ref 20–29)
CREAT SERPL-MCNC: 0.59 MG/DL (ref 0.57–1)
ERYTHROCYTE [DISTWIDTH] IN BLOOD BY AUTOMATED COUNT: 12.1 % (ref 11.7–15.4)
EST. AVERAGE GLUCOSE BLD GHB EST-MCNC: 97 MG/DL
GLOBULIN SER CALC-MCNC: 2.8 G/DL (ref 1.5–4.5)
GLUCOSE SERPL-MCNC: 88 MG/DL (ref 65–99)
HBA1C MFR BLD: 5 % (ref 4.8–5.6)
HCT VFR BLD AUTO: 39 % (ref 34–46.6)
HCV AB S/CO SERPL IA: 3.5 S/CO RATIO (ref 0–0.9)
HDLC SERPL-MCNC: 70 MG/DL
HGB BLD-MCNC: 13.6 G/DL (ref 11.1–15.9)
LDLC SERPL CALC-MCNC: 110 MG/DL (ref 0–99)
MCH RBC QN AUTO: 30.5 PG (ref 26.6–33)
MCHC RBC AUTO-ENTMCNC: 34.9 G/DL (ref 31.5–35.7)
MCV RBC AUTO: 87 FL (ref 79–97)
PLATELET # BLD AUTO: 261 X10E3/UL (ref 150–450)
POTASSIUM SERPL-SCNC: 4.1 MMOL/L (ref 3.5–5.2)
PROT SERPL-MCNC: 7.5 G/DL (ref 6–8.5)
RBC # BLD AUTO: 4.46 X10E6/UL (ref 3.77–5.28)
SODIUM SERPL-SCNC: 141 MMOL/L (ref 134–144)
T4 FREE SERPL-MCNC: 0.97 NG/DL (ref 0.82–1.77)
TRIGL SERPL-MCNC: 94 MG/DL (ref 0–149)
TSH SERPL DL<=0.005 MIU/L-ACNC: 5.67 UIU/ML (ref 0.45–4.5)
VLDLC SERPL CALC-MCNC: 17 MG/DL (ref 5–40)
WBC # BLD AUTO: 4.4 X10E3/UL (ref 3.4–10.8)

## 2021-12-04 NOTE — PROGRESS NOTES
Hep c ab screen positive--need to check and see if evidence hcv --ordered hcv quant --complete this now, we can see if it can be added to blood at lab?     Tsh up, borderline free t4, will need to repeat tsh, free t4, tpo ab in 4-6 weeks

## 2021-12-06 NOTE — PROGRESS NOTES
Called, spoke with Pt. Two pt identifiers confirmed. Pt informed of lab results and recommendations per Dr. Anny Oliva. Pt informed of needing to repeat labs for thyroid in 4-6 weeks. Pt verbalized understanding of information discussed w/ no further questions at this time.

## 2021-12-07 DIAGNOSIS — E07.9 THYROID DYSFUNCTION: Primary | ICD-10-CM

## 2021-12-08 LAB
HCV RNA SERPL NAA+PROBE-ACNC: NORMAL IU/ML
SPECIMEN STATUS REPORT, ROLRST: NORMAL
TEST INFORMATION: NORMAL

## 2021-12-09 ENCOUNTER — PATIENT MESSAGE (OUTPATIENT)
Dept: INTERNAL MEDICINE CLINIC | Age: 51
End: 2021-12-09

## 2021-12-09 DIAGNOSIS — Z11.59 NEED FOR HEPATITIS C SCREENING TEST: Primary | ICD-10-CM

## 2021-12-09 NOTE — TELEPHONE ENCOUNTER
From: Eve Guerrero  To: Jaylon Crawford MD  Sent: 12/9/2021 9:14 AM EST  Subject: HEP C RE-TEST    Good morning. I was just following up to see if there has been anything new on the re-test from [facility] yet?

## 2021-12-09 NOTE — PROGRESS NOTES
Please call patient back about results  Hcv came back negative no hepatitis c. We can repeat hep c ab with her thyroid testing if desired    Remind her to go back to lab for thyroid testing in 4 weeks or so    Please call pt about her email--I dont know what her question is. There has been no lupus testing in years. She previously saw rheumatology about it--she can go back for repeat eval to her rheum if needed.

## 2021-12-09 NOTE — PROGRESS NOTES
Called, spoke with Pt. Two pt identifiers confirmed. Pt given lab results and recommendations per Dr. Adia Bradford. Pt verbalized understanding of information discussed w/ no further questions at this time.

## 2021-12-09 NOTE — TELEPHONE ENCOUNTER
Called, spoke with Pt. Two pt identifiers confirmed. Pt informed the Hep C AB test could have been a false positive but if there are antibodies in your system, it is due to being previously infected and clearing up. Pt informed the next test performed is if there is Hep C in her blood stream which came back negative so she is does not have Hep C.   Pt stated ok she does want the retest.   Pt informed Test were already ordered for her to get done in 4 weeks. Pt stated thank you for having patience and answering my questions. Pt verbalized understanding of information discussed w/ no further questions at this time.

## 2022-01-03 LAB
T4 FREE SERPL-MCNC: 1.02 NG/DL (ref 0.82–1.77)
THYROGLOB AB SERPL-ACNC: 2.6 IU/ML (ref 0–0.9)
THYROPEROXIDASE AB SERPL-ACNC: 115 IU/ML (ref 0–34)
TSH SERPL DL<=0.005 MIU/L-ACNC: 6.76 UIU/ML (ref 0.45–4.5)

## 2022-01-04 ENCOUNTER — DOCUMENTATION ONLY (OUTPATIENT)
Dept: INTERNAL MEDICINE CLINIC | Age: 52
End: 2022-01-04

## 2022-01-04 DIAGNOSIS — E07.9 THYROID DYSFUNCTION: Primary | ICD-10-CM

## 2022-01-04 RX ORDER — LEVOTHYROXINE SODIUM 50 UG/1
50 TABLET ORAL
Qty: 90 TABLET | Refills: 1 | Status: SHIPPED | OUTPATIENT
Start: 2022-01-04 | End: 2022-01-04 | Stop reason: CLARIF

## 2022-01-04 RX ORDER — LEVOTHYROXINE SODIUM 50 UG/1
50 TABLET ORAL
Qty: 90 TABLET | Refills: 0 | Status: SHIPPED | OUTPATIENT
Start: 2022-01-04 | End: 2022-03-13

## 2022-01-04 NOTE — PROGRESS NOTES
Please call patient back about results  recommend starting Synthroid 50 mcg daily for slow thyroid repeat TSH in 6 weeks

## 2022-01-04 NOTE — PROGRESS NOTES
Called, spoke with Pt. Two pt identifiers confirmed. Pt informed of lab results and recommendations per Dr. Justina Juarez. Pt agreed to start taking synthroid for thyroid. Pt agreed to come back in 6 weeks for labs. Pt verbalized understanding of information discussed w/ no further questions at this time.

## 2022-02-01 ENCOUNTER — APPOINTMENT (OUTPATIENT)
Dept: INTERNAL MEDICINE CLINIC | Age: 52
End: 2022-02-01

## 2022-02-02 LAB — TSH SERPL DL<=0.005 MIU/L-ACNC: 2.13 UIU/ML (ref 0.45–4.5)

## 2022-03-12 DIAGNOSIS — E07.9 THYROID DYSFUNCTION: ICD-10-CM

## 2022-03-13 RX ORDER — LEVOTHYROXINE SODIUM 50 UG/1
TABLET ORAL
Qty: 90 TABLET | Refills: 0 | Status: SHIPPED | OUTPATIENT
Start: 2022-03-13 | End: 2022-06-09

## 2022-03-16 ENCOUNTER — OFFICE VISIT (OUTPATIENT)
Dept: URGENT CARE | Age: 52
End: 2022-03-16

## 2022-03-16 VITALS
BODY MASS INDEX: 23.02 KG/M2 | SYSTOLIC BLOOD PRESSURE: 147 MMHG | RESPIRATION RATE: 16 BRPM | TEMPERATURE: 98.4 F | HEART RATE: 71 BPM | WEIGHT: 132 LBS | OXYGEN SATURATION: 100 % | DIASTOLIC BLOOD PRESSURE: 95 MMHG

## 2022-03-16 DIAGNOSIS — L23.7 POISON IVY DERMATITIS: Primary | ICD-10-CM

## 2022-03-16 PROCEDURE — 99212 OFFICE O/P EST SF 10 MIN: CPT | Performed by: NURSE PRACTITIONER

## 2022-03-16 NOTE — PROGRESS NOTES
Here for itchy rash primarily on left shin  Also has few spots on left elbow, right arm  Itchy. No painful. Onset after yardwork 10 days ago  Wants to make sure not shingles; co worker thought it may be shingles.   Has had some relief with prescription triamcinolone and OTC supplement  Denies new or worsening symptoms             Past Medical History:   Diagnosis Date    Arthritis     L knee DJD    GERD (gastroesophageal reflux disease)     Headache     Hx of breast implants, bilateral     Nausea & vomiting     sometimes    Single cyst of left breast     Thyroid disease         Past Surgical History:   Procedure Laterality Date    HC PACK LASER CONE  1992    HX BREAST AUGMENTATION  1998    Saline implants    HX BREAST BIOPSY  x3 -L    2 cysts removed and 1 \"tumor\" removed in the past    HX CYST REMOVAL      HX GYN      removal of 1 ovary and cyst    HX KNEE ARTHROSCOPY  2002    left    HX OTHER SURGICAL      laser surgery for dysplasia    HX OTHER SURGICAL  2010    rectal     HX OTHER SURGICAL      cyst removal from ovaries         Family History   Problem Relation Age of Onset    Breast Cancer Maternal Aunt         diagnosed in 63's    Cancer Maternal Aunt         breast    Breast Cancer Other         diagnosed in 42's    Hypertension Father     Other Father         iritis, colitis    Thyroid Disease Mother     No Known Problems Sister     No Known Problems Sister     No Known Problems Son     No Known Problems Son     Diabetes Paternal Uncle     Diabetes Maternal Grandmother     Cancer Paternal Grandmother         colon    Diabetes Paternal Uncle     Diabetes Paternal Uncle         Social History     Socioeconomic History    Marital status:      Spouse name: Not on file    Number of children: Not on file    Years of education: Not on file    Highest education level: Not on file   Occupational History    Not on file   Tobacco Use    Smoking status: Never Smoker    Smokeless tobacco: Never Used   Substance and Sexual Activity    Alcohol use: No     Comment: rare    Drug use: No     Types: OTC    Sexual activity: Yes     Partners: Male   Other Topics Concern    Not on file   Social History Narrative    Not on file     Social Determinants of Health     Financial Resource Strain:     Difficulty of Paying Living Expenses: Not on file   Food Insecurity:     Worried About Running Out of Food in the Last Year: Not on file    [place] of Food in the Last Year: Not on file   Transportation Needs:     Lack of Transportation (Medical): Not on file    Lack of Transportation (Non-Medical): Not on file   Physical Activity:     Days of Exercise per Week: Not on file    Minutes of Exercise per Session: Not on file   Stress:     Feeling of Stress : Not on file   Social Connections:     Frequency of Communication with Friends and Family: Not on file    Frequency of Social Gatherings with Friends and Family: Not on file    Attends [employer] Services: Not on file    Active Member of [address] [place] [address] or Organizations: Not on file    Attends Club or Organization Meetings: Not on file    Marital Status: Not on file   Intimate Partner Violence:     Fear of Current or Ex-Partner: Not on file    Emotionally Abused: Not on file    Physically Abused: Not on file    Sexually Abused: Not on file   Housing Stability:     Unable to Pay for Housing in the Last Year: Not on file    Number of [place] in the Last Year: Not on file    Unstable Housing in the Last Year: Not on file                ALLERGIES: Sulfa (sulfonamide antibiotics)    Review of Systems   All other systems reviewed and are negative. Vitals:    03/16/22 1358   BP: (!) 147/95   Pulse: 71   Resp: 16   Temp: 98.4 °F (36.9 °C)   SpO2: 100%   Weight: 132 lb (59.9 kg)       Physical Exam  Vitals reviewed. Constitutional:       Appearance: Normal appearance. HENT:      Head: Normocephalic and atraumatic.    Eyes: Extraocular Movements: Extraocular movements intact. Cardiovascular:      Rate and Rhythm: Normal rate and regular rhythm. Pulmonary:      Effort: Pulmonary effort is normal.      Breath sounds: Normal breath sounds. Skin:     Capillary Refill: Capillary refill takes less than 2 seconds. Neurological:      Mental Status: She is alert. Psychiatric:         Mood and Affect: Mood normal.         Behavior: Behavior normal.         Thought Content: Thought content normal.         MDM     Differential Diagnosis; Clinical Impression; Plan:       CLINICAL IMPRESSION:  (L23.7) Poison ivy dermatitis  (primary encounter diagnosis)    No orders of the defined types were placed in this encounter. Plan:  Classic poison ivy dermatitis without complication  May continue triamcinolone topical  Wash any clothing that may have had exposure to poison ivy oils  Follow up as needed for new, worsening or changes   May take several weeks to note continued improvement.               Procedures

## 2022-06-09 DIAGNOSIS — E07.9 THYROID DYSFUNCTION: ICD-10-CM

## 2022-06-09 RX ORDER — LEVOTHYROXINE SODIUM 50 UG/1
TABLET ORAL
Qty: 90 TABLET | Refills: 0 | Status: SHIPPED | OUTPATIENT
Start: 2022-06-09 | End: 2022-08-19

## 2022-08-29 ENCOUNTER — OFFICE VISIT (OUTPATIENT)
Dept: INTERNAL MEDICINE CLINIC | Age: 52
End: 2022-08-29
Payer: COMMERCIAL

## 2022-08-29 VITALS
HEIGHT: 63 IN | TEMPERATURE: 98.5 F | WEIGHT: 129.8 LBS | DIASTOLIC BLOOD PRESSURE: 85 MMHG | RESPIRATION RATE: 16 BRPM | SYSTOLIC BLOOD PRESSURE: 137 MMHG | OXYGEN SATURATION: 98 % | HEART RATE: 64 BPM | BODY MASS INDEX: 23 KG/M2

## 2022-08-29 DIAGNOSIS — Z00.00 PHYSICAL EXAM: Primary | ICD-10-CM

## 2022-08-29 DIAGNOSIS — E03.9 ACQUIRED HYPOTHYROIDISM: ICD-10-CM

## 2022-08-29 DIAGNOSIS — Z23 ENCOUNTER FOR IMMUNIZATION: ICD-10-CM

## 2022-08-29 PROCEDURE — 90471 IMMUNIZATION ADMIN: CPT | Performed by: INTERNAL MEDICINE

## 2022-08-29 PROCEDURE — 90715 TDAP VACCINE 7 YRS/> IM: CPT | Performed by: INTERNAL MEDICINE

## 2022-08-29 PROCEDURE — 99396 PREV VISIT EST AGE 40-64: CPT | Performed by: INTERNAL MEDICINE

## 2022-08-29 NOTE — PROGRESS NOTES
1. \"Have you been to the ER, urgent care clinic since your last visit? Hospitalized since your last visit? \" No    2. \"Have you seen or consulted any other health care providers outside of the [address] [place] [address] since your last visit? \" No     3. For patients aged 39-70: Has the patient had a colonoscopy / FIT/ Cologuard? Yes - no Care Gap present      If the patient is female:    4. For patients aged 41-77: Has the patient had a mammogram within the past 2 years? Yes - Care Gap present. Rooming MA/LPN to request most recent results      5. For patients aged 21-65: Has the patient had a pap smear? Yes - Care Gap present.  Rooming MA/LPN to request most recent results

## 2022-08-29 NOTE — LETTER
8/30/2022 3:26 PM    Ms. Birch [facility] [id number]      Dear Care Provider    Please fax us the most recent pap and mammogram results so that we may update the patient's records for continuity of care. Our fax number: [phone].     Patient:   Domingo Bravo  1970        Sincerely,      Marguerite Cockayne, MD

## 2022-08-30 LAB
ALBUMIN SERPL-MCNC: 5.1 G/DL (ref 3.8–4.9)
ALBUMIN/GLOB SERPL: 1.8 {RATIO} (ref 1.2–2.2)
ALP SERPL-CCNC: 89 IU/L (ref 44–121)
ALT SERPL-CCNC: 17 IU/L (ref 0–32)
AST SERPL-CCNC: 20 IU/L (ref 0–40)
BILIRUB SERPL-MCNC: 0.5 MG/DL (ref 0–1.2)
BUN SERPL-MCNC: 9 MG/DL (ref 6–24)
BUN/CREAT SERPL: 13 (ref 9–23)
CALCIUM SERPL-MCNC: 10.3 MG/DL (ref 8.7–10.2)
CHLORIDE SERPL-SCNC: 102 MMOL/L (ref 96–106)
CHOLEST SERPL-MCNC: 219 MG/DL (ref 100–199)
CO2 SERPL-SCNC: 24 MMOL/L (ref 20–29)
CREAT SERPL-MCNC: 0.72 MG/DL (ref 0.57–1)
EGFR: 100 ML/MIN/1.73
ERYTHROCYTE [DISTWIDTH] IN BLOOD BY AUTOMATED COUNT: 12 % (ref 11.7–15.4)
GLOBULIN SER CALC-MCNC: 2.8 G/DL (ref 1.5–4.5)
GLUCOSE SERPL-MCNC: 86 MG/DL (ref 65–99)
HCT VFR BLD AUTO: 43.7 % (ref 34–46.6)
HDLC SERPL-MCNC: 82 MG/DL
HGB BLD-MCNC: 14.6 G/DL (ref 11.1–15.9)
LDLC SERPL CALC-MCNC: 127 MG/DL (ref 0–99)
MCH RBC QN AUTO: 29.7 PG (ref 26.6–33)
MCHC RBC AUTO-ENTMCNC: 33.4 G/DL (ref 31.5–35.7)
MCV RBC AUTO: 89 FL (ref 79–97)
PLATELET # BLD AUTO: 280 X10E3/UL (ref 150–450)
POTASSIUM SERPL-SCNC: 4.4 MMOL/L (ref 3.5–5.2)
PROT SERPL-MCNC: 7.9 G/DL (ref 6–8.5)
RBC # BLD AUTO: 4.91 X10E6/UL (ref 3.77–5.28)
SODIUM SERPL-SCNC: 143 MMOL/L (ref 134–144)
T4 FREE SERPL-MCNC: 1.6 NG/DL (ref 0.82–1.77)
TRIGL SERPL-MCNC: 58 MG/DL (ref 0–149)
TSH SERPL DL<=0.005 MIU/L-ACNC: 1.03 UIU/ML (ref 0.45–4.5)
VLDLC SERPL CALC-MCNC: 10 MG/DL (ref 5–40)
WBC # BLD AUTO: 4.5 X10E3/UL (ref 3.4–10.8)

## 2022-09-15 DIAGNOSIS — E07.9 THYROID DYSFUNCTION: ICD-10-CM

## 2022-09-15 RX ORDER — LEVOTHYROXINE SODIUM 50 UG/1
TABLET ORAL
Qty: 30 TABLET | Refills: 0 | Status: SHIPPED | OUTPATIENT
Start: 2022-09-15 | End: 2022-10-31

## 2022-10-30 DIAGNOSIS — E07.9 THYROID DYSFUNCTION: ICD-10-CM

## 2022-10-31 RX ORDER — LEVOTHYROXINE SODIUM 50 UG/1
TABLET ORAL
Qty: 30 TABLET | Refills: 0 | Status: SHIPPED | OUTPATIENT
Start: 2022-10-31 | End: 2022-11-29

## 2022-11-12 ENCOUNTER — OFFICE VISIT (OUTPATIENT)
Dept: URGENT CARE | Age: 52
End: 2022-11-12

## 2022-11-12 VITALS
DIASTOLIC BLOOD PRESSURE: 94 MMHG | BODY MASS INDEX: 22.02 KG/M2 | SYSTOLIC BLOOD PRESSURE: 131 MMHG | TEMPERATURE: 98.7 F | WEIGHT: 129 LBS | OXYGEN SATURATION: 99 % | HEIGHT: 64 IN | HEART RATE: 81 BPM | RESPIRATION RATE: 18 BRPM

## 2022-11-12 DIAGNOSIS — J01.90 ACUTE BACTERIAL SINUSITIS: Primary | ICD-10-CM

## 2022-11-12 DIAGNOSIS — B96.89 ACUTE BACTERIAL SINUSITIS: Primary | ICD-10-CM

## 2022-11-12 PROCEDURE — 99213 OFFICE O/P EST LOW 20 MIN: CPT | Performed by: NURSE PRACTITIONER

## 2022-11-12 RX ORDER — AMOXICILLIN AND CLAVULANATE POTASSIUM 875; 125 MG/1; MG/1
1 TABLET, FILM COATED ORAL 2 TIMES DAILY
Qty: 14 TABLET | Refills: 0 | Status: SHIPPED | OUTPATIENT
Start: 2022-11-12 | End: 2022-11-19

## 2022-11-12 RX ORDER — FLUCONAZOLE 150 MG/1
150 TABLET ORAL DAILY
Qty: 1 TABLET | Refills: 0 | Status: SHIPPED | OUTPATIENT
Start: 2022-11-12 | End: 2022-11-13

## 2022-11-12 NOTE — PROGRESS NOTES
Subjective: (As above and below)     The patient/guardian gave verbal consent to treat. Chief Complaint   Patient presents with    Cough     Persistent cough x 1 week. Pt states tickle in throat that requires water to decrease. Lydia Meyers is a 47 y.o. female who presents for evaluation of : nasal congestion, sinus pressure, post nasal drip . Preceding illness: 1 week ago. No other identified aggravating or alleviating factors. Symptoms are constant and overall not improving. Denies: fever, chills, SOB, rashes          ROS  Review of Systems - negative except as listed above    Reviewed PmHx, RxHx, FmHx, SocHx, AllgHx and updated in chart.   Family History   Problem Relation Age of Onset    Breast Cancer Maternal Aunt         diagnosed in 63's    Cancer Maternal Aunt         breast    Breast Cancer Other         diagnosed in 42's    Hypertension Father     Other Father         iritis, colitis    Thyroid Disease Mother     No Known Problems Sister     No Known Problems Sister     No Known Problems Son     No Known Problems Son     Diabetes Paternal Uncle     Diabetes Maternal Grandmother     Cancer Paternal Grandmother         colon    Diabetes Paternal Uncle     Diabetes Paternal Uncle        Past Medical History:   Diagnosis Date    Arthritis     L knee DJD    GERD (gastroesophageal reflux disease)     Headache     Hx of breast implants, bilateral     Nausea & vomiting     sometimes    Single cyst of left breast     Thyroid disease       Social History     Socioeconomic History    Marital status:    Tobacco Use    Smoking status: Never    Smokeless tobacco: Never   Substance and Sexual Activity    Alcohol use: No     Comment: rare    Drug use: No     Types: OTC    Sexual activity: Yes     Partners: Male     Social Determinants of Health     Financial Resource Strain: Low Risk     Difficulty of Paying Living Expenses: Not hard at all   Food Insecurity: No Food Insecurity    Worried About Running Out of Food in the Last Year: Never true    Ran Out of Food in the Last Year: Never true          Current Outpatient Medications   Medication Sig    amoxicillin-clavulanate (Augmentin) 875-125 mg per tablet Take 1 Tablet by mouth two (2) times a day for 7 days. fluconazole (DIFLUCAN) 150 mg tablet Take 1 Tablet by mouth daily for 1 day. FDA advises cautious prescribing of oral fluconazole in pregnancy. levothyroxine (SYNTHROID) 50 mcg tablet TAKE 1 TABLET BY MOUTH EVERY DAY BEFORE BREAKFAST    vitamin e (E GEMS) 100 unit capsule Take  by mouth daily. zonia primrose/linoleic/g-lenic (PRIMROSE OIL PO) Take  by mouth. blue-green algae (SPIRULINA) by Does Not Apply route. fluticasone propionate (FLONASE) 50 mcg/actuation nasal spray 2 Sprays by Both Nostrils route daily. Cetirizine 10 mg cap Take  by mouth. cholecalciferol, vitamin D3, (VITAMIN D3 PO) Take  by mouth. FLAXSEED OIL PO Take 600 mg by mouth daily. No current facility-administered medications for this visit. Objective:     Vitals:    11/12/22 0830 11/12/22 0833   BP:  (!) 131/94   Pulse:  81   Resp:  18   Temp:  98.7 °F (37.1 °C)   SpO2:  99%   Weight: 129 lb (58.5 kg)    Height: 5' 4\" (1.626 m)        Physical Exam  General appearance - appears well hydrated and does not appear toxic, no acute distress  Eyes - EOMs intact. Non injected. No scleral icterus   Ears - no external swelling. TMs normal bilat. Nose - nasal congestion, mucus nasal passages bilat. No purulent drainage  Mouth - OP clear without swelling, exudate or lesion. Mucus membranes moist. Uvula midline. Neck/Lymphatics - trachea midline, full AROM, no LAD of neck  Chest - Normal breathing effort no wheeze rales, rhonchi or diminishments bilaterally. Heart - RRR, no murmurs  Skin - no observable rashes or pallor  Neurologic- alert and oriented x 3  Psychiatric- normal mood, behavior and though content. Assessment/ Plan:     1.  Acute bacterial sinusitis    - amoxicillin-clavulanate (Augmentin) 875-125 mg per tablet; Take 1 Tablet by mouth two (2) times a day for 7 days. Dispense: 14 Tablet; Refill: 0  - fluconazole (DIFLUCAN) 150 mg tablet; Take 1 Tablet by mouth daily for 1 day. FDA advises cautious prescribing of oral fluconazole in pregnancy. Dispense: 1 Tablet; Refill: 0    Will treat with augmentin for sinusitis (bacterial) given 1 week duration not improving  Nasal saline sprays  Diflucan only if develops yeast vaginitis related to abx use; patient has requested this medication      Follow up: Follow up immediately for any new, worsening or changes or if symptoms are not improving over the next 5-7 days.          Kevin Gordillo, NP

## 2022-11-29 DIAGNOSIS — E07.9 THYROID DYSFUNCTION: ICD-10-CM

## 2022-11-29 RX ORDER — LEVOTHYROXINE SODIUM 50 UG/1
TABLET ORAL
Qty: 30 TABLET | Refills: 0 | Status: SHIPPED | OUTPATIENT
Start: 2022-11-29

## 2022-12-19 ENCOUNTER — OFFICE VISIT (OUTPATIENT)
Dept: URGENT CARE | Age: 52
End: 2022-12-19

## 2022-12-19 VITALS
HEART RATE: 87 BPM | RESPIRATION RATE: 16 BRPM | TEMPERATURE: 99.1 F | WEIGHT: 132 LBS | SYSTOLIC BLOOD PRESSURE: 130 MMHG | DIASTOLIC BLOOD PRESSURE: 87 MMHG | HEIGHT: 63 IN | BODY MASS INDEX: 23.39 KG/M2 | OXYGEN SATURATION: 100 %

## 2022-12-19 DIAGNOSIS — J06.9 VIRAL UPPER RESPIRATORY TRACT INFECTION: Primary | ICD-10-CM

## 2022-12-19 PROCEDURE — 99212 OFFICE O/P EST SF 10 MIN: CPT | Performed by: FAMILY MEDICINE

## 2022-12-19 NOTE — PROGRESS NOTES
Nasal Congestion  This is a new problem. The current episode started yesterday. The problem occurs constantly. The problem has not changed since onset. Associated symptoms include headaches. Associated symptoms comments: Congestion- ear pressure- pain on left side of neck  Home test - negative for Covid last night   Mild scratchy throat . Nothing aggravates the symptoms. Nothing relieves the symptoms. She has tried nothing for the symptoms.       Past Medical History:   Diagnosis Date    Arthritis     L knee DJD    GERD (gastroesophageal reflux disease)     Headache     Hx of breast implants, bilateral     Nausea & vomiting     sometimes    Single cyst of left breast     Thyroid disease         Past Surgical History:   Procedure Laterality Date    HC PACK LASER CONE  1992    HX BREAST AUGMENTATION  1998    Saline implants    HX BREAST BIOPSY  x3 -L    2 cysts removed and 1 \"tumor\" removed in the past    HX CYST REMOVAL      HX GYN      removal of 1 ovary and cyst    HX KNEE ARTHROSCOPY  2002    left    HX OTHER SURGICAL      laser surgery for dysplasia    HX OTHER SURGICAL  2010    rectal     HX OTHER SURGICAL      cyst removal from ovaries         Family History   Problem Relation Age of Onset    Breast Cancer Maternal Aunt         diagnosed in 63's    Cancer Maternal Aunt         breast    Breast Cancer Other         diagnosed in 42's    Hypertension Father     Other Father         iritis, colitis    Thyroid Disease Mother     No Known Problems Sister     No Known Problems Sister     No Known Problems Son     No Known Problems Son     Diabetes Paternal Uncle     Diabetes Maternal Grandmother     Cancer Paternal Grandmother         colon    Diabetes Paternal Uncle     Diabetes Paternal Uncle         Social History     Socioeconomic History    Marital status:      Spouse name: Not on file    Number of children: Not on file    Years of education: Not on file    Highest education level: Not on file Occupational History    Not on file   Tobacco Use    Smoking status: Never    Smokeless tobacco: Never   Substance and Sexual Activity    Alcohol use: No     Comment: rare    Drug use: No     Types: OTC    Sexual activity: Yes     Partners: Male   Other Topics Concern    Not on file   Social History Narrative    Not on file     Social Determinants of Health     Financial Resource Strain: Low Risk     Difficulty of Paying Living Expenses: Not hard at all   Food Insecurity: No Food Insecurity    Worried About Running Out of Food in the Last Year: Never true    Ran Out of Food in the Last Year: Never true   Transportation Needs: Not on file   Physical Activity: Not on file   Stress: Not on file   Social Connections: Not on file   Intimate Partner Violence: Not on file   Housing Stability: Not on file                ALLERGIES: Sulfa (sulfonamide antibiotics)    Review of Systems   Constitutional:  Negative for chills and fever. HENT:  Positive for congestion and ear pain. Negative for sinus pressure, sinus pain, sneezing and sore throat. Respiratory:  Negative for cough. Gastrointestinal:  Negative for nausea and vomiting. Neurological:  Positive for headaches. All other systems reviewed and are negative. Vitals:    12/19/22 1213 12/19/22 1216   BP: (!) 143/84 130/87   Pulse: 87    Resp: 16    Temp: 99.1 °F (37.3 °C)    SpO2: 100%    Weight: 132 lb (59.9 kg)    Height: 5' 3\" (1.6 m)        Physical Exam  Vitals and nursing note reviewed. Constitutional:       General: She is not in acute distress. HENT:      Right Ear: Tympanic membrane and ear canal normal. No middle ear effusion. No mastoid tenderness. Left Ear: Tympanic membrane and ear canal normal.  No middle ear effusion. Impacted cerumen: mild ear wax. No mastoid tenderness. Nose: Nose normal.      Mouth/Throat:      Pharynx: No oropharyngeal exudate or posterior oropharyngeal erythema.    Eyes:      General:         Right eye: No discharge. Left eye: No discharge. Conjunctiva/sclera: Conjunctivae normal.   Pulmonary:      Effort: Pulmonary effort is normal. No respiratory distress. Breath sounds: Normal breath sounds. No wheezing, rhonchi or rales. Musculoskeletal:      Cervical back: Neck supple. Lymphadenopathy:      Cervical: No cervical adenopathy. Skin:     Findings: No rash. MDM    Procedures      ICD-10-CM ICD-9-CM    1. Viral upper respiratory tract infection  J06.9 465.9       Otc cold/ flu Rx       No orders of the defined types were placed in this encounter. No results found for any visits on 12/19/22. The patients condition was discussed with the patient and they understand. The patient is to follow up with primary care doctor. If signs and symptoms become worse the pt is to go to the ER. The patient is to take medications as prescribed.

## 2022-12-20 ENCOUNTER — VIRTUAL VISIT (OUTPATIENT)
Dept: INTERNAL MEDICINE CLINIC | Age: 52
End: 2022-12-20
Payer: COMMERCIAL

## 2022-12-20 ENCOUNTER — TELEPHONE (OUTPATIENT)
Dept: INTERNAL MEDICINE CLINIC | Age: 52
End: 2022-12-20

## 2022-12-20 DIAGNOSIS — U07.1 COVID-19: Primary | ICD-10-CM

## 2022-12-20 DIAGNOSIS — R03.0 ELEVATED BP WITHOUT DIAGNOSIS OF HYPERTENSION: ICD-10-CM

## 2022-12-20 PROCEDURE — 99213 OFFICE O/P EST LOW 20 MIN: CPT | Performed by: INTERNAL MEDICINE

## 2022-12-20 NOTE — TELEPHONE ENCOUNTER
Called, spoke to pt  Received two pt identifiers  Pt offered and accepted virtual appt for 12/20/22 @ 845  Pt verbalizes understanding of the instructions and has no further questions at this time.

## 2022-12-20 NOTE — TELEPHONE ENCOUNTER
#[phone]  pt states that she tested positive on 12-19-22  and has headache, fever, is stopped up, crusty eyes and body aches. Pt is asking if Dr. Deepa Figueroa will call in medication for her. Paxlovid? Please call pt to let her know what you can do. Thanks.

## 2022-12-20 NOTE — PROGRESS NOTES
HISTORY OF PRESENT ILLNESS  Pavel Cole is a 47 y.o. female. HPI  Last here 8/29/22. Pt is here for acute care. This is an established visit completed with telemedicine was completed with video assist. The patient acknowledges and agrees to this method of visitation. She c/o hoarseness, cough, headache, fever (up to 102), congestion x 3 days. She went to [address] [place] [address] clinic yesterday, was not tested for COVID, however she went home and took another at-home test after her fever went up to 102, and this was +. Notes her  and son have had some sx's, though have not tested +. She has had 1 [employer] COVID but no flu shot. Recommended testing her other family members. Rx'd Paxlovid. Pt GFR > 60  Discussed possible side effects, potential for COVID rebound, quarantine and masking guidelines. Discussed Paxlovid is under EUA. Discussed going to ER for SOB. Discussed tylenol, advil, flonase for supportive treatment. Pt notes her BP has been fluctuating and running higher than normal.  Of note, she has had borderline BP in the past but is not taking any BP meds. Discussed avoiding sudafed and coming in for BP check in 2/23. Patient Active Problem List    Diagnosis Date Noted    Fibrocystic breast disease 07/29/2016    FH: diabetes mellitus 05/12/2015    Benign breast lumps 12/19/2012    Back skin lesion 12/19/2012    H/O bilateral breast implants 12/19/2012     Current Outpatient Medications   Medication Sig Dispense Refill    levothyroxine (SYNTHROID) 50 mcg tablet TAKE 1 TABLET BY MOUTH EVERY DAY BEFORE BREAKFAST 30 Tablet 0    vitamin e (E GEMS) 100 unit capsule Take  by mouth daily. zonia primrose/linoleic/g-lenic (PRIMROSE OIL PO) Take  by mouth. blue-green algae (SPIRULINA) by Does Not Apply route. fluticasone propionate (FLONASE) 50 mcg/actuation nasal spray 2 Sprays by Both Nostrils route daily. 1 Bottle 0    Cetirizine 10 mg cap Take  by mouth.       cholecalciferol, vitamin D3, (VITAMIN D3 PO) Take  by mouth. FLAXSEED OIL PO Take 600 mg by mouth daily. Past Surgical History:   Procedure Laterality Date    HC PACK LASER CONE  1992    HX BREAST AUGMENTATION  1998    Saline implants    HX BREAST BIOPSY  x3 -L    2 cysts removed and 1 \"tumor\" removed in the past    HX CYST REMOVAL      HX GYN      removal of 1 ovary and cyst    HX KNEE ARTHROSCOPY  2002    left    HX OTHER SURGICAL      laser surgery for dysplasia    HX OTHER SURGICAL  2010    rectal     HX OTHER SURGICAL      cyst removal from ovaries      Lab Results   Component Value Date/Time    WBC 4.5 08/29/2022 12:00 AM    HGB 14.6 08/29/2022 12:00 AM    Hemoglobin (POC) 14.5 06/14/2016 09:26 AM    HCT 43.7 08/29/2022 12:00 AM    PLATELET 266 64/13/6546 12:00 AM    MCV 89 08/29/2022 12:00 AM     Lab Results   Component Value Date/Time    Cholesterol, total 219 (H) 08/29/2022 12:00 AM    HDL Cholesterol 82 08/29/2022 12:00 AM    LDL, calculated 127 (H) 08/29/2022 12:00 AM    LDL, calculated 116 (H) 02/06/2020 08:52 AM    Triglyceride 58 08/29/2022 12:00 AM     Lab Results   Component Value Date/Time    GFR est non- 12/03/2021 04:46 PM    GFR est  12/03/2021 04:46 PM    Creatinine 0.72 08/29/2022 12:00 AM    BUN 9 08/29/2022 12:00 AM    Sodium 143 08/29/2022 12:00 AM    Potassium 4.4 08/29/2022 12:00 AM    Chloride 102 08/29/2022 12:00 AM    CO2 24 08/29/2022 12:00 AM        Review of Systems   Constitutional:  Positive for fever. HENT:  Positive for congestion. Respiratory:  Positive for cough. Negative for shortness of breath. Cardiovascular:  Negative for chest pain. Neurological:  Positive for headaches. Physical Exam  Constitutional:       General: She is not in acute distress. Appearance: Normal appearance. She is not ill-appearing, toxic-appearing or diaphoretic. HENT:      Head: Normocephalic and atraumatic. Eyes:      General:         Right eye: No discharge.          Left eye: No discharge. Conjunctiva/sclera: Conjunctivae normal.   Neurological:      General: No focal deficit present. Mental Status: She is alert and oriented to person, place, and time. Psychiatric:         Mood and Affect: Mood normal.         Behavior: Behavior normal.       ASSESSMENT and PLAN    ICD-10-CM ICD-9-CM    1. COVID-19  U07.1 079.89    Patient positive for COVID she is day 3 of symptoms    Had a positive test last night    Discussed options    We will go ahead and treat with paxlovid discussed this is under EUA    Reviewed medication interaction she has been    Reviewed blood work GFR greater than 60 normal kidney function she is eligible for treatment has borderline hypertension and hypothyroid as chronic medical problems and possible underlying autoimmune disease    Discussed side effects of the antiviral    Discussed COVID rebound discussed quarantine discussed reasons to go to the ER to include difficulty breathing shortness of breath   2. Elevated BP without diagnosis of hypertension  R03.0 796.2    Reviewed blood pressure readings in [facility] has had recurrent fluctuating blood pressure many in the mild hypertensive range she is to come to clinic in February with BP readings to determine if we need to start medication patient expressed understanding to plan   Depression screen reviewed and negative. Scribed by Iesha Caldwell, as dictated by Dr. Jesse Adamson. Current diagnosis and concerns discussed with pt at length. Pt understands risks and benefits or current treatment plan and medications, and accepts the treatment and medication with any possible risks. Pt asks appropriate questions, which were answered. Pt was instructed to call with any concerns or problems. I have reviewed the note documented by the scribe. The services provided are my own. The documentation is accurate.   Tom Lopez, who was evaluated through a synchronous (real-time) audio-video encounter, and/or her healthcare decision maker, is aware that it is a billable service, which includes applicable co-pays, with coverage as determined by her insurance carrier. She provided verbal consent to proceed and patient identification was verified. This visit was conducted pursuant to the emergency declaration under the [address] [place] [address], [address] [place] [address] authority and the [email or web address] and [employer] General Act. A caregiver was present when appropriate. Ability to conduct physical exam was limited. The patient was located at: Home: [place] [phone]  The provider was located at:  Facility (Appt Department): [place] [id number]    --Alber Ferrera on 12/20/2022 at 8:43 AM

## 2022-12-21 ENCOUNTER — TELEPHONE (OUTPATIENT)
Dept: INTERNAL MEDICINE CLINIC | Age: 52
End: 2022-12-21

## 2022-12-21 NOTE — TELEPHONE ENCOUNTER
States not able to take the med that was sent in     [facility] at 4 pm she threw it up.       [facility] was fine after that,, but hasn't taken any more of the med since then

## 2022-12-21 NOTE — TELEPHONE ENCOUNTER
Called, spoke to pt  Received two pt identifiers  Pt informed per Dr. Wan Laws can stop medication at this time and take otc meds to help with sx  Pt verbalizes understanding of the instructions and has no further questions at this time.

## 2022-12-23 DIAGNOSIS — E07.9 THYROID DYSFUNCTION: ICD-10-CM

## 2022-12-23 RX ORDER — LEVOTHYROXINE SODIUM 50 UG/1
TABLET ORAL
Qty: 30 TABLET | Refills: 0 | Status: SHIPPED | OUTPATIENT
Start: 2022-12-23

## 2023-01-24 DIAGNOSIS — E07.9 THYROID DYSFUNCTION: ICD-10-CM

## 2023-01-25 RX ORDER — LEVOTHYROXINE SODIUM 50 UG/1
TABLET ORAL
Qty: 30 TABLET | Refills: 0 | Status: SHIPPED | OUTPATIENT
Start: 2023-01-25

## 2023-02-17 DIAGNOSIS — E07.9 THYROID DYSFUNCTION: ICD-10-CM

## 2023-02-17 RX ORDER — LEVOTHYROXINE SODIUM 50 UG/1
TABLET ORAL
Qty: 30 TABLET | Refills: 0 | Status: SHIPPED | OUTPATIENT
Start: 2023-02-17

## 2023-03-17 DIAGNOSIS — E07.9 THYROID DYSFUNCTION: ICD-10-CM

## 2023-03-17 RX ORDER — LEVOTHYROXINE SODIUM 50 UG/1
TABLET ORAL
Qty: 30 TABLET | Refills: 0 | Status: SHIPPED | OUTPATIENT
Start: 2023-03-17

## 2023-05-13 DIAGNOSIS — E07.9 DISORDER OF THYROID, UNSPECIFIED: ICD-10-CM

## 2023-05-14 RX ORDER — LEVOTHYROXINE SODIUM 0.05 MG/1
TABLET ORAL
Qty: 30 TABLET | Refills: 0 | Status: SHIPPED | OUTPATIENT
Start: 2023-05-14

## 2023-06-17 DIAGNOSIS — E07.9 DISORDER OF THYROID, UNSPECIFIED: ICD-10-CM

## 2023-06-18 RX ORDER — LEVOTHYROXINE SODIUM 0.05 MG/1
TABLET ORAL
Qty: 90 TABLET | Refills: 0 | Status: SHIPPED | OUTPATIENT
Start: 2023-06-18

## 2023-07-28 ASSESSMENT — ENCOUNTER SYMPTOMS: SHORTNESS OF BREATH: 0

## 2023-07-28 NOTE — PROGRESS NOTES
HISTORY OF PRESENT ILLNESS  Daquan Patel is a 46 y.o. female. HPI    Last here vv 12/20/22. Pt is here for routine care. She c/o of a HA today because she did not take her caffeinated beverages mild not having recurrent issues    She mentions some mornings her hands are swollen to the point where she cannot wear her rings. She endorses some stiffness in her fingers. She states this normally resolves after a few hours  She also mentions her feet tend to swell if she sits for extended periods. Discussed this is probably normal dependent edema discussed elevation of extremities no edema today, will re-assess after labs    BP today 108/72  She is no longer checking BP at home, she prev was but states it was fine so she stopped     Wt today is 132 lbs, up 3 lbs since lov  Her weight is within normal ranges     Reviewed labs   Ordered labs        She saw Dr. Marcelo Ervin for L hand  She had an injection earlier this month 8/22  Last visit 8/1/22  Reviewed note:  Assessment:     1. Arthritis of carpometacarpal (CMC) joint of left thumb     Plan:   I discussed with the patient the nature of their condition and treatment options. They desires to proceed with an injection of the left basal joint due to significant symptoms. Risks, benefits and alternatives are discussed and they consent to proceed. This does include the significant risk of a steroid flare reaction. I performed this today under sterile conditions and they tolerated it well. Sent over to therapy today for a left Friedens  splint for basal joint protection. Wearing instructions discussed. They will f/u on an as needed basis.       Pt follows with Dr Sheldon Solis (derm)  Last visit was 9/19       Recall Pt saw Dr. Amada Maradiaga (rheum) in the past  Pt completed labs and was told that she had no lupus   However, pt had positive lab tests for lupus  Her double stranded DNA was positive for the lupus     Had a positive hepatitis C screen we then checked for hepatitis C

## 2023-08-08 ENCOUNTER — OFFICE VISIT (OUTPATIENT)
Age: 53
End: 2023-08-08
Payer: COMMERCIAL

## 2023-08-08 VITALS
RESPIRATION RATE: 14 BRPM | SYSTOLIC BLOOD PRESSURE: 108 MMHG | HEART RATE: 66 BPM | TEMPERATURE: 98.2 F | BODY MASS INDEX: 23.46 KG/M2 | WEIGHT: 132.4 LBS | DIASTOLIC BLOOD PRESSURE: 72 MMHG | OXYGEN SATURATION: 99 % | HEIGHT: 63 IN

## 2023-08-08 DIAGNOSIS — Z00.00 PHYSICAL EXAM: Primary | ICD-10-CM

## 2023-08-08 DIAGNOSIS — Z23 NEED FOR PROPHYLACTIC VACCINATION AND INOCULATION AGAINST VARICELLA: ICD-10-CM

## 2023-08-08 DIAGNOSIS — E03.9 ACQUIRED HYPOTHYROIDISM: ICD-10-CM

## 2023-08-08 PROCEDURE — 99396 PREV VISIT EST AGE 40-64: CPT | Performed by: INTERNAL MEDICINE

## 2023-08-08 PROCEDURE — 90471 IMMUNIZATION ADMIN: CPT | Performed by: INTERNAL MEDICINE

## 2023-08-08 PROCEDURE — 90750 HZV VACC RECOMBINANT IM: CPT | Performed by: INTERNAL MEDICINE

## 2023-08-08 SDOH — ECONOMIC STABILITY: FOOD INSECURITY: WITHIN THE PAST 12 MONTHS, YOU WORRIED THAT YOUR FOOD WOULD RUN OUT BEFORE YOU GOT MONEY TO BUY MORE.: NEVER TRUE

## 2023-08-08 SDOH — ECONOMIC STABILITY: FOOD INSECURITY: WITHIN THE PAST 12 MONTHS, THE FOOD YOU BOUGHT JUST DIDN'T LAST AND YOU DIDN'T HAVE MONEY TO GET MORE.: NEVER TRUE

## 2023-08-08 SDOH — ECONOMIC STABILITY: INCOME INSECURITY: HOW HARD IS IT FOR YOU TO PAY FOR THE VERY BASICS LIKE FOOD, HOUSING, MEDICAL CARE, AND HEATING?: NOT HARD AT ALL

## 2023-08-08 SDOH — ECONOMIC STABILITY: HOUSING INSECURITY
IN THE LAST 12 MONTHS, WAS THERE A TIME WHEN YOU DID NOT HAVE A STEADY PLACE TO SLEEP OR SLEPT IN A SHELTER (INCLUDING NOW)?: NO

## 2023-08-08 ASSESSMENT — PATIENT HEALTH QUESTIONNAIRE - PHQ9
SUM OF ALL RESPONSES TO PHQ QUESTIONS 1-9: 0
SUM OF ALL RESPONSES TO PHQ QUESTIONS 1-9: 0
SUM OF ALL RESPONSES TO PHQ9 QUESTIONS 1 & 2: 0
SUM OF ALL RESPONSES TO PHQ QUESTIONS 1-9: 0
1. LITTLE INTEREST OR PLEASURE IN DOING THINGS: 0
SUM OF ALL RESPONSES TO PHQ QUESTIONS 1-9: 0
2. FEELING DOWN, DEPRESSED OR HOPELESS: 0

## 2023-08-08 NOTE — PROGRESS NOTES
1. \"Have you been to the ER, urgent care clinic since your last visit? Hospitalized since your last visit? \" No    2. \"Have you seen or consulted any other health care providers outside of the [address] [place] [address] since your last visit? \" No     3. For patients aged 43-73: Has the patient had a colonoscopy / FIT/ Cologuard? Yes - Care Gap present. Most recent result on file      If the patient is female:    4. For patients aged 43-66: Has the patient had a mammogram within the past 2 years? Yes - Care Gap present. Most recent result on file      5. For patients aged 21-65: Has the patient had a pap smear?  No

## 2023-08-09 LAB
ERYTHROCYTE [DISTWIDTH] IN BLOOD BY AUTOMATED COUNT: 12.6 % (ref 11.5–14.5)
EST. AVERAGE GLUCOSE BLD GHB EST-MCNC: 85 MG/DL
HBA1C MFR BLD: 4.6 % (ref 4–5.6)
HCT VFR BLD AUTO: 43.5 % (ref 35–47)
HGB BLD-MCNC: 14 G/DL (ref 11.5–16)
HIV 1+2 AB+HIV1 P24 AG SERPL QL IA: NONREACTIVE
HIV 1/2 RESULT COMMENT: NORMAL
MCH RBC QN AUTO: 29 PG (ref 26–34)
MCHC RBC AUTO-ENTMCNC: 32.2 G/DL (ref 30–36.5)
MCV RBC AUTO: 90.1 FL (ref 80–99)
NRBC # BLD: 0 K/UL (ref 0–0.01)
NRBC BLD-RTO: 0 PER 100 WBC
PLATELET # BLD AUTO: 262 K/UL (ref 150–400)
PMV BLD AUTO: 10.8 FL (ref 8.9–12.9)
RBC # BLD AUTO: 4.83 M/UL (ref 3.8–5.2)
WBC # BLD AUTO: 5.2 K/UL (ref 3.6–11)

## 2023-08-10 LAB
ALBUMIN SERPL-MCNC: 4.2 G/DL (ref 3.5–5)
ALBUMIN/GLOB SERPL: 1.2 (ref 1.1–2.2)
ALP SERPL-CCNC: 92 U/L (ref 45–117)
ALT SERPL-CCNC: 36 U/L (ref 12–78)
ANION GAP SERPL CALC-SCNC: 7 MMOL/L (ref 5–15)
AST SERPL-CCNC: 20 U/L (ref 15–37)
BILIRUB SERPL-MCNC: 0.5 MG/DL (ref 0.2–1)
BUN SERPL-MCNC: 13 MG/DL (ref 6–20)
BUN/CREAT SERPL: 20 (ref 12–20)
CALCIUM SERPL-MCNC: 9 MG/DL (ref 8.5–10.1)
CCP IGA+IGG SERPL IA-ACNC: 6 UNITS (ref 0–19)
CHLORIDE SERPL-SCNC: 107 MMOL/L (ref 97–108)
CHOLEST SERPL-MCNC: 209 MG/DL
CO2 SERPL-SCNC: 26 MMOL/L (ref 21–32)
CREAT SERPL-MCNC: 0.65 MG/DL (ref 0.55–1.02)
GLOBULIN SER CALC-MCNC: 3.5 G/DL (ref 2–4)
GLUCOSE SERPL-MCNC: 86 MG/DL (ref 65–100)
HDLC SERPL-MCNC: 73 MG/DL
HDLC SERPL: 2.9 (ref 0–5)
LDLC SERPL CALC-MCNC: 112.4 MG/DL (ref 0–100)
POTASSIUM SERPL-SCNC: 4.3 MMOL/L (ref 3.5–5.1)
PROT SERPL-MCNC: 7.7 G/DL (ref 6.4–8.2)
RHEUMATOID FACT SERPL-ACNC: 10.7 IU/ML
SODIUM SERPL-SCNC: 140 MMOL/L (ref 136–145)
T4 FREE SERPL-MCNC: 1.2 NG/DL (ref 0.8–1.5)
TRIGL SERPL-MCNC: 118 MG/DL
TSH SERPL DL<=0.05 MIU/L-ACNC: 1.02 UIU/ML (ref 0.36–3.74)
VLDLC SERPL CALC-MCNC: 23.6 MG/DL

## 2023-09-03 DIAGNOSIS — E07.9 DISORDER OF THYROID, UNSPECIFIED: ICD-10-CM

## 2023-09-04 RX ORDER — LEVOTHYROXINE SODIUM 0.05 MG/1
TABLET ORAL
Qty: 90 TABLET | Refills: 0 | Status: SHIPPED | OUTPATIENT
Start: 2023-09-04

## 2023-11-06 NOTE — TELEPHONE ENCOUNTER
Called, spoke to pt. Two pt identifiers confirmed. Informed patient per Dr. Shelly Pepe she is to return in 1 year  (2/2020) for her yearly follow up. Pt verbalized understanding of information discussed w/ no further questions at this time.
Patient wasn't sure if you wanted to come back in 6 months or a year. Her number is [phone].        Message received & copied from [facility]
Abo

## 2023-11-17 ENCOUNTER — OFFICE VISIT (OUTPATIENT)
Age: 53
End: 2023-11-17

## 2023-11-17 VITALS
SYSTOLIC BLOOD PRESSURE: 135 MMHG | BODY MASS INDEX: 23.91 KG/M2 | DIASTOLIC BLOOD PRESSURE: 82 MMHG | HEART RATE: 71 BPM | TEMPERATURE: 98.8 F | OXYGEN SATURATION: 100 % | RESPIRATION RATE: 18 BRPM | WEIGHT: 135 LBS

## 2023-11-17 DIAGNOSIS — H61.23 BILATERAL IMPACTED CERUMEN: ICD-10-CM

## 2023-11-17 DIAGNOSIS — H60.393 OTITIS, EXTERNA, INFECTIVE, BILATERAL: Primary | ICD-10-CM

## 2023-11-17 PROBLEM — M79.629 PAIN IN AXILLA: Status: ACTIVE | Noted: 2023-03-28

## 2023-11-17 RX ORDER — OFLOXACIN 3 MG/ML
5 SOLUTION AURICULAR (OTIC) 2 TIMES DAILY
Qty: 3.5 ML | Refills: 0 | Status: SHIPPED | OUTPATIENT
Start: 2023-11-17 | End: 2023-11-24

## 2023-11-20 ENCOUNTER — TELEPHONE (OUTPATIENT)
Age: 53
End: 2023-11-20

## 2023-11-20 DIAGNOSIS — H60.393 OTITIS, EXTERNA, INFECTIVE, BILATERAL: Primary | ICD-10-CM

## 2023-11-20 RX ORDER — AMOXICILLIN 875 MG/1
875 TABLET, COATED ORAL 2 TIMES DAILY
Qty: 20 TABLET | Refills: 0 | Status: SHIPPED | OUTPATIENT
Start: 2023-11-20 | End: 2023-11-30

## 2023-12-05 DIAGNOSIS — E07.9 DISORDER OF THYROID, UNSPECIFIED: ICD-10-CM

## 2023-12-05 RX ORDER — LEVOTHYROXINE SODIUM 0.05 MG/1
TABLET ORAL
Qty: 90 TABLET | Refills: 0 | Status: SHIPPED | OUTPATIENT
Start: 2023-12-05

## 2024-03-03 DIAGNOSIS — E07.9 DISORDER OF THYROID, UNSPECIFIED: ICD-10-CM

## 2024-03-03 RX ORDER — LEVOTHYROXINE SODIUM 0.05 MG/1
TABLET ORAL
Qty: 90 TABLET | Refills: 0 | Status: SHIPPED | OUTPATIENT
Start: 2024-03-03

## 2024-05-03 ENCOUNTER — PATIENT MESSAGE (OUTPATIENT)
Age: 54
End: 2024-05-03

## 2024-05-03 NOTE — TELEPHONE ENCOUNTER
Called, Spoke with Pt  Received two pt identifiers  Pt informed per Dr. Hernandez can take a few days to a week for the body to regulate after missing a few doses of the thyroid medication  Advised pt can do a vv if would like  Pt states actually feels better since she sent the email  Advised pt if sx worsen then to let us know and can add on for an appt  Pt verbalizes understanding of the instructions and has no further questions at this time.

## 2024-05-03 NOTE — TELEPHONE ENCOUNTER
Heaters, Aria VALLADARES MA 5/3/2024 1:47 PM EDT      ----- Message -----  From: Dana Duffy  Sent: 5/3/2024 1:20 PM EDT  To: *  Subject: THYROID     Good afternoon.  Tuesday I wasn't feeling well, just feeling off, tired, blood pressure was up. Wednesday I got up and still didn't feel right and realized I had missed a few days of my thyroid medication.  How long does it take the medication to regulate in your system? I am on day 3 of the medicine but still am feeling a little off.  Thanks!

## 2024-05-19 DIAGNOSIS — E07.9 DISORDER OF THYROID, UNSPECIFIED: ICD-10-CM

## 2024-05-19 RX ORDER — LEVOTHYROXINE SODIUM 0.05 MG/1
TABLET ORAL
Qty: 90 TABLET | Refills: 0 | Status: SHIPPED | OUTPATIENT
Start: 2024-05-19

## 2024-08-12 ASSESSMENT — ENCOUNTER SYMPTOMS: SHORTNESS OF BREATH: 0

## 2024-08-13 ENCOUNTER — OFFICE VISIT (OUTPATIENT)
Age: 54
End: 2024-08-13
Payer: COMMERCIAL

## 2024-08-13 VITALS
HEIGHT: 63 IN | DIASTOLIC BLOOD PRESSURE: 80 MMHG | SYSTOLIC BLOOD PRESSURE: 128 MMHG | WEIGHT: 133 LBS | BODY MASS INDEX: 23.57 KG/M2 | RESPIRATION RATE: 18 BRPM | HEART RATE: 67 BPM | OXYGEN SATURATION: 98 % | TEMPERATURE: 97.5 F

## 2024-08-13 DIAGNOSIS — E03.9 ACQUIRED HYPOTHYROIDISM: ICD-10-CM

## 2024-08-13 DIAGNOSIS — Z00.00 PHYSICAL EXAM: ICD-10-CM

## 2024-08-13 DIAGNOSIS — Z00.00 PHYSICAL EXAM: Primary | ICD-10-CM

## 2024-08-13 PROBLEM — M79.629 PAIN IN AXILLA: Status: RESOLVED | Noted: 2023-03-28 | Resolved: 2024-08-13

## 2024-08-13 LAB
ALBUMIN SERPL-MCNC: 4.2 G/DL (ref 3.5–5)
ALBUMIN/GLOB SERPL: 1.2 (ref 1.1–2.2)
ALP SERPL-CCNC: 89 U/L (ref 45–117)
ALT SERPL-CCNC: 30 U/L (ref 12–78)
ANION GAP SERPL CALC-SCNC: 3 MMOL/L (ref 5–15)
AST SERPL-CCNC: 23 U/L (ref 15–37)
BILIRUB SERPL-MCNC: 0.4 MG/DL (ref 0.2–1)
BUN SERPL-MCNC: 11 MG/DL (ref 6–20)
BUN/CREAT SERPL: 17 (ref 12–20)
CALCIUM SERPL-MCNC: 9.7 MG/DL (ref 8.5–10.1)
CHLORIDE SERPL-SCNC: 109 MMOL/L (ref 97–108)
CHOLEST SERPL-MCNC: 201 MG/DL
CO2 SERPL-SCNC: 29 MMOL/L (ref 21–32)
CREAT SERPL-MCNC: 0.63 MG/DL (ref 0.55–1.02)
ERYTHROCYTE [DISTWIDTH] IN BLOOD BY AUTOMATED COUNT: 12.7 % (ref 11.5–14.5)
EST. AVERAGE GLUCOSE BLD GHB EST-MCNC: 97 MG/DL
GLOBULIN SER CALC-MCNC: 3.6 G/DL (ref 2–4)
GLUCOSE SERPL-MCNC: 92 MG/DL (ref 65–100)
HBA1C MFR BLD: 5 % (ref 4–5.6)
HCT VFR BLD AUTO: 42.3 % (ref 35–47)
HDLC SERPL-MCNC: 72 MG/DL
HDLC SERPL: 2.8 (ref 0–5)
HGB BLD-MCNC: 13.8 G/DL (ref 11.5–16)
LDLC SERPL CALC-MCNC: 113.4 MG/DL (ref 0–100)
MCH RBC QN AUTO: 29.6 PG (ref 26–34)
MCHC RBC AUTO-ENTMCNC: 32.6 G/DL (ref 30–36.5)
MCV RBC AUTO: 90.6 FL (ref 80–99)
NRBC # BLD: 0 K/UL (ref 0–0.01)
NRBC BLD-RTO: 0 PER 100 WBC
PLATELET # BLD AUTO: 270 K/UL (ref 150–400)
PMV BLD AUTO: 10.6 FL (ref 8.9–12.9)
POTASSIUM SERPL-SCNC: 4.3 MMOL/L (ref 3.5–5.1)
PROT SERPL-MCNC: 7.8 G/DL (ref 6.4–8.2)
RBC # BLD AUTO: 4.67 M/UL (ref 3.8–5.2)
SODIUM SERPL-SCNC: 141 MMOL/L (ref 136–145)
TRIGL SERPL-MCNC: 78 MG/DL
TSH SERPL DL<=0.05 MIU/L-ACNC: 0.57 UIU/ML (ref 0.36–3.74)
VLDLC SERPL CALC-MCNC: 15.6 MG/DL
WBC # BLD AUTO: 3.8 K/UL (ref 3.6–11)

## 2024-08-13 PROCEDURE — 99396 PREV VISIT EST AGE 40-64: CPT | Performed by: INTERNAL MEDICINE

## 2024-08-13 SDOH — ECONOMIC STABILITY: INCOME INSECURITY: HOW HARD IS IT FOR YOU TO PAY FOR THE VERY BASICS LIKE FOOD, HOUSING, MEDICAL CARE, AND HEATING?: NOT HARD AT ALL

## 2024-08-13 SDOH — ECONOMIC STABILITY: FOOD INSECURITY: WITHIN THE PAST 12 MONTHS, THE FOOD YOU BOUGHT JUST DIDN'T LAST AND YOU DIDN'T HAVE MONEY TO GET MORE.: NEVER TRUE

## 2024-08-13 SDOH — ECONOMIC STABILITY: FOOD INSECURITY: WITHIN THE PAST 12 MONTHS, YOU WORRIED THAT YOUR FOOD WOULD RUN OUT BEFORE YOU GOT MONEY TO BUY MORE.: NEVER TRUE

## 2024-08-13 ASSESSMENT — PATIENT HEALTH QUESTIONNAIRE - PHQ9
SUM OF ALL RESPONSES TO PHQ QUESTIONS 1-9: 0
SUM OF ALL RESPONSES TO PHQ QUESTIONS 1-9: 0
1. LITTLE INTEREST OR PLEASURE IN DOING THINGS: NOT AT ALL
2. FEELING DOWN, DEPRESSED OR HOPELESS: NOT AT ALL
SUM OF ALL RESPONSES TO PHQ9 QUESTIONS 1 & 2: 0
SUM OF ALL RESPONSES TO PHQ QUESTIONS 1-9: 0
SUM OF ALL RESPONSES TO PHQ QUESTIONS 1-9: 0

## 2024-08-13 NOTE — PROGRESS NOTES
\"Have you been to the ER, urgent care clinic since your last visit?  Hospitalized since your last visit?\"    NO    “Have you seen or consulted any other health care providers outside of [facility] since your last visit?”    NO     “Have you had a pap smear?”    NO scheduled for October    No cervical cancer screening on file             Click Here for Release of Records Request    
 Appearance: She is not ill-appearing, toxic-appearing or diaphoretic.   HENT:      Head: Normocephalic and atraumatic.      Right Ear: Ear canal and external ear normal. There is no impacted cerumen.      Left Ear: Ear canal and external ear normal. There is no impacted cerumen.   Eyes:      General:         Right eye: No discharge.         Left eye: No discharge.      Extraocular Movements: Extraocular movements intact.   Neck:      Vascular: No carotid bruit.   Cardiovascular:      Rate and Rhythm: Normal rate and regular rhythm.      Heart sounds: Normal heart sounds. No murmur heard.  Pulmonary:      Effort: Pulmonary effort is normal. No respiratory distress.      Breath sounds: Normal breath sounds. No wheezing.   Abdominal:      General: Abdomen is flat. There is no distension.      Palpations: Abdomen is soft. There is no mass.      Tenderness: There is no abdominal tenderness.      Hernia: No hernia is present.   Musculoskeletal:         General: No swelling, tenderness or deformity.      Cervical back: Normal range of motion and neck supple. No tenderness.      Right lower leg: No edema.      Left lower leg: No edema.   Lymphadenopathy:      Cervical: No cervical adenopathy.   Skin:     General: Skin is warm and dry.      Findings: No erythema.   Neurological:      General: No focal deficit present.      Mental Status: She is oriented to person, place, and time. Mental status is at baseline.      Gait: Gait normal.   Psychiatric:         Mood and Affect: Mood normal.           ASSESSMENT and PLAN   Diagnosis Orders   1. Physical exam  CBC    Comprehensive Metabolic Panel   Normal weight and blood pressure    Declines COVID booster    Declines flu shot    Colonoscopy up-to-date pelvic mammogram are scheduled Shingrix repeat dose due she will come at a later time to get this done Lipid Panel    TSH   Hypothyroid controlled on Synthroid check TSH Hemoglobin A1C       I have reviewed the note documented by

## 2024-10-27 DIAGNOSIS — E07.9 DISORDER OF THYROID, UNSPECIFIED: ICD-10-CM

## 2024-10-28 RX ORDER — LEVOTHYROXINE SODIUM 50 UG/1
TABLET ORAL
Qty: 90 TABLET | Refills: 0 | Status: SHIPPED | OUTPATIENT
Start: 2024-10-28

## 2025-01-20 DIAGNOSIS — E07.9 DISORDER OF THYROID, UNSPECIFIED: ICD-10-CM

## 2025-01-21 RX ORDER — LEVOTHYROXINE SODIUM 50 UG/1
TABLET ORAL
Qty: 90 TABLET | Refills: 0 | Status: SHIPPED | OUTPATIENT
Start: 2025-01-21

## 2025-04-17 DIAGNOSIS — E07.9 DISORDER OF THYROID, UNSPECIFIED: ICD-10-CM

## 2025-04-17 RX ORDER — LEVOTHYROXINE SODIUM 50 UG/1
50 TABLET ORAL
Qty: 90 TABLET | Refills: 0 | Status: SHIPPED | OUTPATIENT
Start: 2025-04-17

## 2025-07-18 DIAGNOSIS — E07.9 DISORDER OF THYROID, UNSPECIFIED: ICD-10-CM

## 2025-07-18 RX ORDER — LEVOTHYROXINE SODIUM 50 UG/1
50 TABLET ORAL
Qty: 90 TABLET | Refills: 0 | Status: SHIPPED | OUTPATIENT
Start: 2025-07-18

## 2025-08-20 ENCOUNTER — PATIENT MESSAGE (OUTPATIENT)
Age: 55
End: 2025-08-20

## 2025-08-20 ENCOUNTER — OFFICE VISIT (OUTPATIENT)
Age: 55
End: 2025-08-20
Payer: COMMERCIAL

## 2025-08-20 VITALS
TEMPERATURE: 97.9 F | HEIGHT: 63 IN | SYSTOLIC BLOOD PRESSURE: 138 MMHG | HEART RATE: 61 BPM | OXYGEN SATURATION: 97 % | WEIGHT: 134.25 LBS | DIASTOLIC BLOOD PRESSURE: 84 MMHG | BODY MASS INDEX: 23.79 KG/M2

## 2025-08-20 DIAGNOSIS — E07.9 DISORDER OF THYROID, UNSPECIFIED: ICD-10-CM

## 2025-08-20 DIAGNOSIS — Z00.00 PHYSICAL EXAM: Primary | ICD-10-CM

## 2025-08-20 PROCEDURE — 99396 PREV VISIT EST AGE 40-64: CPT | Performed by: INTERNAL MEDICINE

## 2025-08-20 RX ORDER — SERTRALINE HYDROCHLORIDE 25 MG/1
25 TABLET, FILM COATED ORAL DAILY
Qty: 90 TABLET | Refills: 0 | Status: SHIPPED | OUTPATIENT
Start: 2025-08-20

## 2025-08-20 RX ORDER — LEVOTHYROXINE SODIUM 50 UG/1
50 TABLET ORAL
Qty: 90 TABLET | Refills: 0 | Status: SHIPPED | OUTPATIENT
Start: 2025-08-20

## 2025-08-20 SDOH — ECONOMIC STABILITY: FOOD INSECURITY: WITHIN THE PAST 12 MONTHS, THE FOOD YOU BOUGHT JUST DIDN'T LAST AND YOU DIDN'T HAVE MONEY TO GET MORE.: NEVER TRUE

## 2025-08-20 SDOH — ECONOMIC STABILITY: FOOD INSECURITY: WITHIN THE PAST 12 MONTHS, YOU WORRIED THAT YOUR FOOD WOULD RUN OUT BEFORE YOU GOT MONEY TO BUY MORE.: NEVER TRUE

## 2025-08-20 ASSESSMENT — PATIENT HEALTH QUESTIONNAIRE - PHQ9
SUM OF ALL RESPONSES TO PHQ QUESTIONS 1-9: 0
2. FEELING DOWN, DEPRESSED OR HOPELESS: NOT AT ALL
SUM OF ALL RESPONSES TO PHQ QUESTIONS 1-9: 0
SUM OF ALL RESPONSES TO PHQ QUESTIONS 1-9: 0
1. LITTLE INTEREST OR PLEASURE IN DOING THINGS: NOT AT ALL
SUM OF ALL RESPONSES TO PHQ QUESTIONS 1-9: 0

## 2025-08-21 LAB
ALBUMIN SERPL-MCNC: 4.2 G/DL (ref 3.5–5.2)
ALBUMIN/GLOB SERPL: 1.2 (ref 1.1–2.2)
ALP SERPL-CCNC: 90 U/L (ref 35–104)
ALT SERPL-CCNC: 26 U/L (ref 10–35)
ANION GAP SERPL CALC-SCNC: 13 MMOL/L (ref 2–14)
AST SERPL-CCNC: 24 U/L (ref 10–35)
BILIRUB SERPL-MCNC: 0.3 MG/DL (ref 0–1.2)
BUN SERPL-MCNC: 13 MG/DL (ref 6–20)
BUN/CREAT SERPL: 21 (ref 12–20)
CALCIUM SERPL-MCNC: 9.5 MG/DL (ref 8.6–10)
CHLORIDE SERPL-SCNC: 108 MMOL/L (ref 98–107)
CHOLEST SERPL-MCNC: 217 MG/DL (ref 0–200)
CO2 SERPL-SCNC: 25 MMOL/L (ref 20–29)
CREAT SERPL-MCNC: 0.6 MG/DL (ref 0.6–1)
ERYTHROCYTE [DISTWIDTH] IN BLOOD BY AUTOMATED COUNT: 12.6 % (ref 11.5–14.5)
EST. AVERAGE GLUCOSE BLD GHB EST-MCNC: 101 MG/DL
GLOBULIN SER CALC-MCNC: 3.4 G/DL (ref 2–4)
GLUCOSE SERPL-MCNC: 81 MG/DL (ref 65–100)
HBA1C MFR BLD: 5.2 % (ref 4–5.6)
HCT VFR BLD AUTO: 42.8 % (ref 35–47)
HDLC SERPL-MCNC: 77 MG/DL (ref 40–60)
HDLC SERPL: 2.8 (ref 0–5)
HGB BLD-MCNC: 14.2 G/DL (ref 11.5–16)
LDLC SERPL CALC-MCNC: 126 MG/DL (ref 0–100)
MCH RBC QN AUTO: 29.6 PG (ref 26–34)
MCHC RBC AUTO-ENTMCNC: 33.2 G/DL (ref 30–36.5)
MCV RBC AUTO: 89.2 FL (ref 80–99)
NRBC # BLD: 0 K/UL (ref 0–0.01)
NRBC BLD-RTO: 0 PER 100 WBC
PLATELET # BLD AUTO: 286 K/UL (ref 150–400)
PMV BLD AUTO: 10.3 FL (ref 8.9–12.9)
POTASSIUM SERPL-SCNC: 4 MMOL/L (ref 3.5–5.1)
PROT SERPL-MCNC: 7.6 G/DL (ref 6.4–8.3)
RBC # BLD AUTO: 4.8 M/UL (ref 3.8–5.2)
SODIUM SERPL-SCNC: 146 MMOL/L (ref 136–145)
TRIGL SERPL-MCNC: 72 MG/DL (ref 0–150)
TSH, 3RD GENERATION: 1.33 UIU/ML (ref 0.27–4.2)
VLDLC SERPL CALC-MCNC: 15 MG/DL
WBC # BLD AUTO: 5 K/UL (ref 3.6–11)